# Patient Record
Sex: MALE | Race: WHITE | NOT HISPANIC OR LATINO | Employment: FULL TIME | URBAN - METROPOLITAN AREA
[De-identification: names, ages, dates, MRNs, and addresses within clinical notes are randomized per-mention and may not be internally consistent; named-entity substitution may affect disease eponyms.]

---

## 2018-02-14 ENCOUNTER — HOSPITAL ENCOUNTER (EMERGENCY)
Facility: HOSPITAL | Age: 58
Discharge: HOME | End: 2018-02-14
Attending: EMERGENCY MEDICINE | Admitting: EMERGENCY MEDICINE
Payer: COMMERCIAL

## 2018-02-14 VITALS
OXYGEN SATURATION: 96 % | SYSTOLIC BLOOD PRESSURE: 139 MMHG | RESPIRATION RATE: 20 BRPM | HEART RATE: 63 BPM | WEIGHT: 223 LBS | TEMPERATURE: 97.3 F | DIASTOLIC BLOOD PRESSURE: 97 MMHG

## 2018-02-14 DIAGNOSIS — J32.9 SINUSITIS: Primary | ICD-10-CM

## 2018-02-14 PROCEDURE — 99283 EMERGENCY DEPT VISIT LOW MDM: CPT

## 2018-02-14 RX ORDER — AMOXICILLIN AND CLAVULANATE POTASSIUM 875; 125 MG/1; MG/1
1 TABLET, FILM COATED ORAL ONCE
Status: COMPLETED | OUTPATIENT
Start: 2018-02-14 | End: 2018-02-14

## 2018-02-14 RX ORDER — LORATADINE 10 MG/1
10 TABLET ORAL DAILY
Qty: 20 TABLET | Refills: 0 | Status: SHIPPED | OUTPATIENT
Start: 2018-02-14 | End: 2018-02-16

## 2018-02-14 RX ORDER — TRAMADOL HYDROCHLORIDE 50 MG/1
100 TABLET ORAL ONCE
Status: COMPLETED | OUTPATIENT
Start: 2018-02-14 | End: 2018-02-14

## 2018-02-14 RX ORDER — AMOXICILLIN AND CLAVULANATE POTASSIUM 875; 125 MG/1; MG/1
1 TABLET, FILM COATED ORAL EVERY 12 HOURS SCHEDULED
Qty: 20 TABLET | Refills: 0 | Status: SHIPPED | OUTPATIENT
Start: 2018-02-14 | End: 2018-02-24

## 2018-02-14 RX ADMIN — TRAMADOL HYDROCHLORIDE 100 MG: 50 TABLET, FILM COATED ORAL at 21:09

## 2018-02-14 RX ADMIN — AMOXICILLIN AND CLAVULANATE POTASSIUM 1 TABLET: 875; 125 TABLET, FILM COATED ORAL at 21:09

## 2018-02-15 NOTE — ED NOTES
Pt reports pain to left side of face and under left eye extending towards left ear  Pt reports pain same a couple of weeks ago that subsided on its own  Pt reports pain to area with pressure, runny nose, post nasal drip and pt reports feeling dizzy a "couple times "  No distress noted         Kaia Barrios RN  02/14/18 2041

## 2018-02-15 NOTE — ED PROVIDER NOTES
History  Chief Complaint   Patient presents with    Facial Pain     L sided facial pain for weeks, was relieved with sinus meds but it came back     56 yowm says he feels like he has a sinus infection on left  C/o aching pain in left face off and on x 2 weeks  Not sharp, shooting or elcectric shock like  + partial relief with otc sinus medicine  Today, pain worse and more constant   + associated congestion  + felt a little dizzy today  No vision changes, no n/v/d  No fever  Prior to Admission Medications   Prescriptions Last Dose Informant Patient Reported? Taking? OMEPRAZOLE PO   Yes Yes   Sig: Take by mouth daily      Facility-Administered Medications: None       Past Medical History:   Diagnosis Date    GERD (gastroesophageal reflux disease)        Past Surgical History:   Procedure Laterality Date    KIDNEY SURGERY         History reviewed  No pertinent family history  I have reviewed and agree with the history as documented  Social History   Substance Use Topics    Smoking status: Never Smoker    Smokeless tobacco: Never Used    Alcohol use Yes      Comment: socially        Review of Systems   Constitutional: Negative  Negative for chills and fever  HENT: Positive for congestion and sinus pressure  Negative for sore throat  Eyes: Negative  Respiratory: Negative  Negative for cough and shortness of breath  Cardiovascular: Negative  Negative for chest pain and leg swelling  Gastrointestinal: Negative  Negative for abdominal pain, diarrhea, nausea and vomiting  Genitourinary: Negative  Negative for dysuria, flank pain and hematuria  Musculoskeletal: Negative  Negative for back pain and myalgias  Skin: Negative  Negative for rash and wound  Neurological: Positive for dizziness  Negative for headaches  Psychiatric/Behavioral: Negative  Negative for confusion and hallucinations  The patient is not nervous/anxious      All other systems reviewed and are negative  Physical Exam  ED Triage Vitals [02/14/18 1919]   Temperature Pulse Respirations Blood Pressure SpO2   (!) 97 3 °F (36 3 °C) 75 20 (!) 140/102 96 %      Temp Source Heart Rate Source Patient Position - Orthostatic VS BP Location FiO2 (%)   Tympanic Monitor Sitting Left arm --      Pain Score       5           Orthostatic Vital Signs  Vitals:    02/14/18 1919   BP: (!) 140/102   Pulse: 75   Patient Position - Orthostatic VS: Sitting       Physical Exam   Constitutional: He is oriented to person, place, and time  He appears well-developed and well-nourished  No distress  Not ill or toxic appearing  Not  In distress  HENT:   Head: Normocephalic and atraumatic  Right Ear: Tympanic membrane normal    Left Ear: Tympanic membrane normal    Nose: Nose normal    Mouth/Throat: No oropharyngeal exudate  Eyes: Conjunctivae are normal  Pupils are equal, round, and reactive to light  No scleral icterus  Neck: Normal range of motion  Neck supple  Cardiovascular: Normal rate, regular rhythm and normal heart sounds  No murmur heard  Pulmonary/Chest: Effort normal and breath sounds normal  No respiratory distress  Abdominal: Soft  He exhibits no distension  Musculoskeletal: Normal range of motion  He exhibits no edema, tenderness or deformity  Neurological: He is alert and oriented to person, place, and time  No cranial nerve deficit  He exhibits normal muscle tone  Skin: Skin is warm and dry  No rash noted  He is not diaphoretic  No erythema  No pallor  Psychiatric: He has a normal mood and affect  His behavior is normal    Nursing note and vitals reviewed        ED Medications  Medications   amoxicillin-clavulanate (AUGMENTIN) 875-125 mg per tablet 1 tablet (not administered)   traMADol (ULTRAM) tablet 100 mg (not administered)       Diagnostic Studies  Results Reviewed     None                 No orders to display              Procedures  Procedures       Phone Contacts  ED Phone Contact    ED Course  ED Course                                MDM  Number of Diagnoses or Management Options  Diagnosis management comments: Will try course of augmentin, claritin D  Advised tylenol/advil prn  Follow up if any problems or worsening  CritCare Time    Disposition  Final diagnoses:   Sinusitis     Time reflects when diagnosis was documented in both MDM as applicable and the Disposition within this note     Time User Action Codes Description Comment    6/91/0185  4:54 PM Irineo MATOS Add [K70 7] Sinusitis       ED Disposition     ED Disposition Condition Comment    Discharge  [facility] discharge to home/self care  Condition at discharge: Stable        Follow-up Information     Follow up With Specialties Details Why Contact Info    your doctor  Schedule an appointment as soon as possible for a visit As needed         Patient's Medications   Discharge Prescriptions    AMOXICILLIN-CLAVULANATE (AUGMENTIN) 875-125 MG PER TABLET    Take 1 tablet by mouth every 12 (twelve) hours for 10 days       Start Date: 2/14/2018 End Date: 2/24/2018       Order Dose: 1 tablet       Quantity: 20 tablet    Refills: 0    LORATADINE (CLARITIN) 10 MG TABLET    Take 1 tablet (10 mg total) by mouth daily       Start Date: 2/14/2018 End Date: --       Order Dose: 10 mg       Quantity: 20 tablet    Refills: 0     No discharge procedures on file      ED Provider  Electronically Signed by           Pretty Umanzor MD  29/39/28 3410

## 2018-02-15 NOTE — DISCHARGE INSTRUCTIONS
Rhinosinusitis - take the antibiotic as prescribed and Claritin-D or Allegra-D (these are over the counter)  Tylenol and/or advil as needed for pain  See your doctor if any problems  WHAT YOU NEED TO KNOW:   Rhinosinusitis (RS) is inflammation of your nose and sinuses  It commonly begins as a virus, often as a common cold  Viruses usually last 7 to 10 days and do not need treatment  When the virus does not get better on its own, you may have bacterial RS  This means that bacteria have begun to grow inside your sinuses  Acute RS lasts less than 4 weeks  Chronic RS lasts 12 weeks or more  Recurrent RS is when you have 4 or more episodes of RS in one year  DISCHARGE INSTRUCTIONS:   Return to the emergency department if:   · Your eye and eyelid are red, swollen, and painful  · You cannot open your eye  · You have double vision or you cannot see  · Your eyeball bulges out or you cannot move your eye  · You are more sleepy than normal or you notice changes in your ability to think, move, or talk  · You have a stiff neck, a fever, or a bad headache  · You have swelling of your forehead or scalp  Contact your healthcare provider if:   · Your symptoms are worse or do not improve after 3 to 5 days of treatment  · You have questions or concerns about your condition or care  Medicines: You may need any of the following:  · Acetaminophen  decreases pain and fever  It is available without a doctor's order  Ask how much to take and how often to take it  Follow directions  Acetaminophen can cause liver damage if not taken correctly  · NSAIDs , such as ibuprofen, help decrease swelling, pain, and fever  This medicine is available with or without a doctor's order  NSAIDs can cause stomach bleeding or kidney problems in certain people  If you take blood thinner medicine, always ask your healthcare provider if NSAIDs are safe for you   Always read the medicine label and follow directions  · Nasal saline sprays  decrease inflammation in your nose and sinuses  · Decongestants  reduce swelling and drain mucus in the nose and sinuses  They may help you breathe easier  · Antihistamines  dry mucus in the nose and relieve sneezing  · Antibiotics  treat a bacterial infection and may be needed if your symptoms do not improve or they get worse  · Take your medicine as directed  Contact your healthcare provider if you think your medicine is not helping or if you have side effects  Tell him or her if you are allergic to any medicine  Keep a list of the medicines, vitamins, and herbs you take  Include the amounts, and when and why you take them  Bring the list or the pill bottles to follow-up visits  Carry your medicine list with you in case of an emergency  Self-care:   · Rinse your sinuses  Use a sinus rinse device to rinse your nasal passages with a saline (salt water) solution  This will help thin the mucus in your nose and rinse away pollen and dirt  It will also help reduce swelling so you can breathe normally  Ask your healthcare provider how often to do this  · Breathe in steam   Heat a bowl of water until you see steam  Lean over the bowl and make a tent over your head with a large towel  Breathe deeply for about 20 minutes  Be careful not to get too close to the steam or burn yourself  Do this 3 times a day  You can also breathe deeply when you take a hot shower  · Sleep with your head elevated  Place an extra pillow under your head before you go to sleep to help your sinuses drain  · Drink liquids as directed  Ask your healthcare provider how much liquid to drink each day and which liquids are best for you  Liquids will thin the mucus in your nose and help it drain  Avoid drinks that contain alcohol or caffeine  · Do not smoke, and avoid secondhand smoke  Nicotine and other chemicals in cigarettes and cigars can make your symptoms worse   Ask your healthcare provider for information if you currently smoke and need help to quit  E-cigarettes or smokeless tobacco still contain nicotine  Talk to your healthcare provider before you use these products  Follow up with your healthcare provider as directed: Follow up if your symptoms are worse or not better after 3 to 5 days of treatment  Write down your questions so you remember to ask them during your visits  © 2017 [employer] [employer] Information is for End User's use only and may not be sold, redistributed or otherwise used for commercial purposes  All illustrations and images included in CareNotes® are the copyrighted property of [employer] , [employer]  or [employer]aleb Cornejo  The above information is an  only  It is not intended as medical advice for individual conditions or treatments  Talk to your doctor, nurse or pharmacist before following any medical regimen to see if it is safe and effective for you

## 2018-02-16 ENCOUNTER — HOSPITAL ENCOUNTER (EMERGENCY)
Facility: HOSPITAL | Age: 58
Discharge: HOME | End: 2018-02-16
Attending: EMERGENCY MEDICINE
Payer: COMMERCIAL

## 2018-02-16 VITALS
TEMPERATURE: 96.9 F | DIASTOLIC BLOOD PRESSURE: 120 MMHG | OXYGEN SATURATION: 97 % | RESPIRATION RATE: 20 BRPM | HEART RATE: 70 BPM | SYSTOLIC BLOOD PRESSURE: 180 MMHG

## 2018-02-16 DIAGNOSIS — J32.9 SINUSITIS: Primary | ICD-10-CM

## 2018-02-16 PROCEDURE — 99283 EMERGENCY DEPT VISIT LOW MDM: CPT

## 2018-02-16 RX ORDER — FLUTICASONE PROPIONATE 50 MCG
1 SPRAY, SUSPENSION (ML) NASAL DAILY
Qty: 16 G | Refills: 0 | Status: SHIPPED | OUTPATIENT
Start: 2018-02-16

## 2018-02-16 NOTE — DISCHARGE INSTRUCTIONS

## 2018-02-16 NOTE — ED PROVIDER NOTES
History  Chief Complaint   Patient presents with    Sinus Problem     dx with sinusitis a couple of days ago  was here, put on antibiotics  states pain is so severe     Patient has history of sinusitis which is recurrent and recently has been having problems with a sinus congestion and pain again  Patient has a fullness feeling in his head above his eyes and in the maxillary area, sometimes worse with position change of bending over  Is no fever  Patient was seen here and was seen by his PCP, and the symptoms have been ongoing for about 2 weeks he was placed on antibiotics which she has been taking  Patient however has not noted any improvement with the antibiotics and still having congestion and pain  As we were discussing options he states he has categorically against taking strong decongestants like Sudafed  It was only after extensive discussion that he agree to [facility] as a steroid that he also is against            Prior to Admission Medications   Prescriptions Last Dose Informant Patient Reported? Taking? OMEPRAZOLE PO   Yes No   Sig: Take by mouth daily   amoxicillin-clavulanate (AUGMENTIN) 875-125 mg per tablet   No No   Sig: Take 1 tablet by mouth every 12 (twelve) hours for 10 days      Facility-Administered Medications: None       Past Medical History:   Diagnosis Date    GERD (gastroesophageal reflux disease)        Past Surgical History:   Procedure Laterality Date    KIDNEY SURGERY         History reviewed  No pertinent family history  I have reviewed and agree with the history as documented  Social History   Substance Use Topics    Smoking status: Never Smoker    Smokeless tobacco: Never Used    Alcohol use Yes      Comment: socially        Review of Systems   Constitutional: Negative for fever  HENT: Positive for congestion, sinus pain, sinus pressure and sore throat  Negative for sneezing  Eyes: Positive for pain  Neurological: Positive for headaches     All other systems reviewed and are negative  Physical Exam  ED Triage Vitals [02/16/18 1410]   Temperature Pulse Respirations Blood Pressure SpO2   (!) 96 9 °F (36 1 °C) 70 20 (!) 180/120 97 %      Temp Source Heart Rate Source Patient Position - Orthostatic VS BP Location FiO2 (%)   Tympanic Monitor Sitting Left arm --      Pain Score       7           Orthostatic Vital Signs  Vitals:    02/16/18 1410   BP: (!) 180/120   Pulse: 70   Patient Position - Orthostatic VS: Sitting       Physical Exam   Constitutional: He is oriented to person, place, and time  He appears well-developed and well-nourished  HENT:   Head: Normocephalic and atraumatic  Right Ear: External ear normal    Left Ear: External ear normal    Mouth/Throat: Oropharynx is clear and moist  No oropharyngeal exudate  Tender to palpation on frontal and ethmoid and maxillary sinus   Eyes: Conjunctivae and EOM are normal    Neck: Normal range of motion  Neck supple  Cardiovascular: Normal rate, regular rhythm and normal heart sounds  Pulmonary/Chest: Effort normal and breath sounds normal    Abdominal: Soft  Bowel sounds are normal    Musculoskeletal: Normal range of motion  Neurological: He is alert and oriented to person, place, and time  Skin: Skin is warm and dry  Psychiatric: He has a normal mood and affect  His behavior is normal    Nursing note and vitals reviewed  ED Medications  Medications - No data to display    Diagnostic Studies  Results Reviewed     None                 No orders to display              Procedures  Procedures       Phone Contacts  ED Phone Contact    ED Course  ED Course                                MDM  Number of Diagnoses or Management Options  Sinusitis:   Diagnosis management comments: Patient has chronic and acute recurrent sinusitis  I tried to encourage him to maximize therapy as much as possible    Also he should consider ENT consultation for possible surgery    CritCare Time    Disposition  Final diagnoses: Sinusitis     Time reflects when diagnosis was documented in both MDM as applicable and the Disposition within this note     Time User Action Codes Description Comment    2/16/2018  3:02 PM Luis Madridence CARLO Juarez [J32 9] Sinusitis       ED Disposition     ED Disposition Condition Comment    Discharge  [facility] discharge to home/self care  Condition at discharge: Stable        Follow-up Information     Follow up With Specialties Details Why Susan 140, CRNP Nurse Practitioner Schedule an appointment as soon as possible for a visit in 1 day  [address] [place] [address]  [phone]          Discharge Medication List as of 2/16/2018  3:05 PM      START taking these medications    Details   fluticasone (FLONASE) 50 mcg/act nasal spray 1 spray into each nostril daily, Starting Fri 2/16/2018, Print         CONTINUE these medications which have NOT CHANGED    Details   amoxicillin-clavulanate (AUGMENTIN) 875-125 mg per tablet Take 1 tablet by mouth every 12 (twelve) hours for 10 days, Starting Wed 2/14/2018, Until Sat 2/24/2018, Print      OMEPRAZOLE PO Take by mouth daily, Historical Med           No discharge procedures on file      ED Provider  Electronically Signed by           Delio Chiu MD  02/25/18 5860

## 2018-02-28 ENCOUNTER — HOSPITAL ENCOUNTER (EMERGENCY)
Facility: HOSPITAL | Age: 58
Discharge: HOME | End: 2018-02-28
Admitting: EMERGENCY MEDICINE
Payer: COMMERCIAL

## 2018-02-28 ENCOUNTER — APPOINTMENT (EMERGENCY)
Dept: RADIOLOGY | Facility: HOSPITAL | Age: 58
End: 2018-02-28
Payer: COMMERCIAL

## 2018-02-28 VITALS
WEIGHT: 223 LBS | OXYGEN SATURATION: 94 % | SYSTOLIC BLOOD PRESSURE: 178 MMHG | RESPIRATION RATE: 20 BRPM | TEMPERATURE: 97.6 F | HEART RATE: 73 BPM | DIASTOLIC BLOOD PRESSURE: 106 MMHG

## 2018-02-28 DIAGNOSIS — H61.20 CERUMEN IMPACTION: Primary | ICD-10-CM

## 2018-02-28 DIAGNOSIS — H92.09 EARACHE: ICD-10-CM

## 2018-02-28 PROCEDURE — 70450 CT HEAD/BRAIN W/O DYE: CPT

## 2018-02-28 PROCEDURE — 99283 EMERGENCY DEPT VISIT LOW MDM: CPT

## 2018-02-28 PROCEDURE — 96372 THER/PROPH/DIAG INJ SC/IM: CPT

## 2018-02-28 RX ORDER — NAPROXEN 500 MG/1
500 TABLET ORAL 2 TIMES DAILY WITH MEALS
Qty: 14 TABLET | Refills: 0 | Status: SHIPPED | OUTPATIENT
Start: 2018-02-28 | End: 2018-03-07

## 2018-02-28 RX ORDER — AMOXICILLIN AND CLAVULANATE POTASSIUM 875; 125 MG/1; MG/1
1 TABLET, FILM COATED ORAL EVERY 12 HOURS SCHEDULED
COMMUNITY

## 2018-02-28 RX ORDER — KETOROLAC TROMETHAMINE 30 MG/ML
30 INJECTION, SOLUTION INTRAMUSCULAR; INTRAVENOUS ONCE
Status: COMPLETED | OUTPATIENT
Start: 2018-02-28 | End: 2018-02-28

## 2018-02-28 RX ADMIN — CARBAMIDE PEROXIDE 6.5% 10 DROP: 6.5 LIQUID AURICULAR (OTIC) at 16:12

## 2018-02-28 RX ADMIN — KETOROLAC TROMETHAMINE 30 MG: 30 INJECTION, SOLUTION INTRAMUSCULAR at 16:12

## 2018-02-28 NOTE — DISCHARGE INSTRUCTIONS
Cerumen Impaction   WHAT YOU NEED TO KNOW:   Cerumen impaction is the blockage of the outer ear canal by tightly packed cerumen (earwax)  It is generally treated with procedures such as flushing or suctioning the ear canal or the use of instruments to remove the impaction  DISCHARGE INSTRUCTIONS:   Medicines:  · Ear drops: These are used to soften the wax in your ear  Wax softening ear drops may be bought without a prescription  Ask your healthcare provider how often you should use this medicine  Read the instructions carefully before you use the ear drops  Do the following when you put in ear drops:     ¨ Warm the drops by holding the bottle in your hands for a few minutes  Cold ear drops may make you dizzy  ¨ Lie down with the affected ear toward the ceiling  You may also stand with your head tilted to one side  ¨ Pull your ear lobe up and back, and place the correct number of drops into the ear  ¨ Keep your ear facing up for 5 to 10 minutes so the drops coat the outer ear canal      ¨ Gently clean the outer part of the ear with a cotton swab  Do not  place the cotton swab or anything inside your ear canal  This increases the risk of damaging your eardrum  · Take your medicine as directed  Contact your healthcare provider if you think your medicine is not helping or if you have side effects  Tell him of her if you are allergic to any medicine  Keep a list of the medicines, vitamins, and herbs you take  Include the amounts, and when and why you take them  Bring the list or the pill bottles to follow-up visits  Carry your medicine list with you in case of an emergency  Follow up with your healthcare provider as directed:  Write down your questions so you remember to ask them during your visits  Contact your healthcare provider if:   · You have a fever  · You have trouble hearing or ringing in your ear  · You have questions about your condition or care    Return to the emergency department if:   · You feel dizzy  · You have discharge or blood coming out of your ear  · Your ear pain does not go away or gets worse  © 2017 [employer] Information is for End User's use only and may not be sold, redistributed or otherwise used for commercial purposes  All illustrations and images included in CareNotes® are the copyrighted property of [employer]  or Caleb Cornejo  The above information is an  only  It is not intended as medical advice for individual conditions or treatments  Talk to your doctor, nurse or pharmacist before following any medical regimen to see if it is safe and effective for you

## 2018-02-28 NOTE — ED PROVIDER NOTES
History  Chief Complaint   Patient presents with   Carlos Grubbs     here a couple of weeks ago with sinus issues  today c/o ear pain and swollen glands for about 5 days  History provided by:  Patient   used: No    Earache   Location:  Bilateral  Behind ear:  No abnormality  Quality:  Aching and pressure  Severity:  Moderate  Onset quality:  Sudden  Duration:  5 days  Timing:  Constant  Progression:  Worsening  Chronicity:  New  Context: not direct blow, not elevation change, not foreign body in ear, not loud noise, not recent URI and not water in ear    Associated symptoms: no abdominal pain, no congestion, no cough, no diarrhea, no ear discharge, no fever, no headaches, no hearing loss, no neck pain, no rash, no rhinorrhea, no sore throat, no tinnitus and no vomiting    Risk factors: no recent travel, no chronic ear infection and no prior ear surgery        Prior to Admission Medications   Prescriptions Last Dose Informant Patient Reported? Taking? OMEPRAZOLE PO   Yes No   Sig: Take by mouth daily   amoxicillin-clavulanate (AUGMENTIN) 875-125 mg per tablet   Yes Yes   Sig: Take 1 tablet by mouth every 12 (twelve) hours   fluticasone (FLONASE) 50 mcg/act nasal spray   No No   Si spray into each nostril daily      Facility-Administered Medications: None       Past Medical History:   Diagnosis Date    GERD (gastroesophageal reflux disease)        Past Surgical History:   Procedure Laterality Date    KIDNEY SURGERY         History reviewed  No pertinent family history  I have reviewed and agree with the history as documented  Social History   Substance Use Topics    Smoking status: Never Smoker    Smokeless tobacco: Never Used    Alcohol use Yes      Comment: socially        Review of Systems   Constitutional: Negative for appetite change, chills, diaphoresis, fatigue and fever  HENT: Positive for ear pain and sinus pressure   Negative for congestion, ear discharge, hearing loss, rhinorrhea, sore throat and tinnitus  Eyes: Negative for photophobia and visual disturbance  Respiratory: Negative for cough, chest tightness, shortness of breath and wheezing  Gastrointestinal: Negative for abdominal pain, diarrhea, nausea and vomiting  Genitourinary: Negative  Musculoskeletal: Negative for myalgias, neck pain and neck stiffness  Skin: Negative for color change, pallor and rash  Neurological: Negative for dizziness, seizures, weakness, light-headedness and headaches  Hematological: Negative for adenopathy  Psychiatric/Behavioral: Negative for confusion  Physical Exam  ED Triage Vitals [02/28/18 1430]   Temperature Pulse Respirations Blood Pressure SpO2   97 6 °F (36 4 °C) 73 20 (!) 178/106 94 %      Temp Source Heart Rate Source Patient Position - Orthostatic VS BP Location FiO2 (%)   Tympanic Monitor Sitting Right arm --      Pain Score       7           Orthostatic Vital Signs  Vitals:    02/28/18 1430   BP: (!) 178/106   Pulse: 73   Patient Position - Orthostatic VS: Sitting       Physical Exam   Constitutional: He is oriented to person, place, and time  He appears well-developed and well-nourished  No distress  HENT:   Head: Normocephalic and atraumatic  Right Ear: External ear normal  Decreased hearing is noted  Left Ear: External ear normal  Decreased hearing is noted  Nose: Right sinus exhibits frontal sinus tenderness  Left sinus exhibits frontal sinus tenderness  Mouth/Throat: Oropharynx is clear and moist and mucous membranes are normal    Excessive cerumen bilateral auditory canals  TMs could not be fully visualized  Eyes: Conjunctivae are normal  Right eye exhibits no discharge  Left eye exhibits no discharge  Neck: Normal range of motion  Neck supple  No tracheal deviation present  Cardiovascular: Normal rate, regular rhythm, normal heart sounds and intact distal pulses  Exam reveals no friction rub      No murmur heard   Pulmonary/Chest: Effort normal and breath sounds normal  No respiratory distress  He has no wheezes  Lymphadenopathy:     He has no cervical adenopathy  Neurological: He is alert and oriented to person, place, and time  He exhibits normal muscle tone  Coordination normal    Skin: Skin is warm and dry  Capillary refill takes less than 2 seconds  No rash noted  He is not diaphoretic  No pallor  Nursing note and vitals reviewed  ED Medications  Medications   carbamide peroxide (DEBROX) 6 5 % otic solution 10 drop (10 drops Both Ears Given 2/28/18 1612)   ketorolac (TORADOL) injection 30 mg (30 mg Intramuscular Given 2/28/18 1612)       Diagnostic Studies  Results Reviewed     None                 CT head without contrast   Final Result by Rashid Rich MD (02/28 1625)      No acute intracranial abnormality  Workstation performed: [id number]                    Procedures  Procedures       Phone Contacts  ED Phone Contact    ED Course  ED Course                                MDM  Number of Diagnoses or Management Options  Cerumen impaction: new and does not require workup  Earache: new and requires workup  Diagnosis management comments: CT of the patient's head was negative for acute findings  Debrox was administered here in the ED as well as Toradol, the patient had moderate relief of symptoms  He was discharged in no acute distress with a course of Debrox, and supportive therapy for pain  He was instructed follow up with his PCP for re-evaluation as soon as possible, or return to the ED if any worsening symptoms develop  The patient verbalized understanding of all instructions         Amount and/or Complexity of Data Reviewed  Tests in the radiology section of CPT®: ordered and reviewed  Review and summarize past medical records: yes      CritCare Time    Disposition  Final diagnoses:   Cerumen impaction   Earache     Time reflects when diagnosis was documented in both MDM as applicable and the Disposition within this note     Time User Action Codes Description Comment    2/28/2018  5:13 PM Queta Chimera Add [H61 20] Cerumen impaction     2/28/2018  5:13 PM Queta Chimera Add [H92 09] Earache       ED Disposition     ED Disposition Condition Comment    Discharge  [facility] discharge to home/self care  Condition at discharge: Stable        Follow-up Information     Follow up With Specialties Details Why [address] Emergency Department Emergency Medicine Go to If symptoms worsen [address] [facility]  [phone] [facility] ED, [address], [facility], [address] [place] [address] Nurse Practitioner Go to As needed for follow-up if no improvement of symptoms [address]   [phone]           Discharge Medication List as of 2/28/2018  5:14 PM      START taking these medications    Details   carbamide peroxide (DEBROX) 6 5 % otic solution Administer 5 drops into ears 2 (two) times a day, Starting Wed 2/28/2018, Print      naproxen (NAPROSYN) 500 mg tablet Take 1 tablet (500 mg total) by mouth 2 (two) times a day with meals for 7 days, Starting Wed 2/28/2018, Until Wed 3/7/2018, Print         CONTINUE these medications which have NOT CHANGED    Details   amoxicillin-clavulanate (AUGMENTIN) 875-125 mg per tablet Take 1 tablet by mouth every 12 (twelve) hours, Historical Med      fluticasone (FLONASE) 50 mcg/act nasal spray 1 spray into each nostril daily, Starting Fri 2/16/2018, Print      OMEPRAZOLE PO Take by mouth daily, Historical Med           No discharge procedures on file      ED Provider  Electronically Signed by           Bessy Grimm PA-C  02/28/18 1954

## 2021-04-08 DIAGNOSIS — Z23 ENCOUNTER FOR IMMUNIZATION: ICD-10-CM

## 2021-04-17 ENCOUNTER — IMMUNIZATIONS (OUTPATIENT)
Dept: FAMILY MEDICINE CLINIC | Facility: HOSPITAL | Age: 61
End: 2021-04-17

## 2021-04-17 DIAGNOSIS — Z23 ENCOUNTER FOR IMMUNIZATION: Primary | ICD-10-CM

## 2021-04-17 PROCEDURE — 91300 SARS-COV-2 / COVID-19 MRNA VACCINE (PFIZER-BIONTECH) 30 MCG: CPT

## 2021-04-17 PROCEDURE — 0001A SARS-COV-2 / COVID-19 MRNA VACCINE (PFIZER-BIONTECH) 30 MCG: CPT

## 2021-05-08 ENCOUNTER — IMMUNIZATIONS (OUTPATIENT)
Dept: FAMILY MEDICINE CLINIC | Facility: HOSPITAL | Age: 61
End: 2021-05-08

## 2021-05-08 DIAGNOSIS — Z23 ENCOUNTER FOR IMMUNIZATION: Primary | ICD-10-CM

## 2021-05-08 PROCEDURE — 0002A SARS-COV-2 / COVID-19 MRNA VACCINE (PFIZER-BIONTECH) 30 MCG: CPT

## 2021-05-08 PROCEDURE — 91300 SARS-COV-2 / COVID-19 MRNA VACCINE (PFIZER-BIONTECH) 30 MCG: CPT

## 2023-05-01 ENCOUNTER — OFFICE VISIT (OUTPATIENT)
Dept: FAMILY MEDICINE CLINIC | Facility: CLINIC | Age: 63
End: 2023-05-01

## 2023-05-01 ENCOUNTER — TELEPHONE (OUTPATIENT)
Dept: ADMINISTRATIVE | Facility: OTHER | Age: 63
End: 2023-05-01

## 2023-05-01 VITALS
HEIGHT: 70 IN | BODY MASS INDEX: 31.64 KG/M2 | RESPIRATION RATE: 16 BRPM | TEMPERATURE: 97.6 F | OXYGEN SATURATION: 97 % | WEIGHT: 221 LBS | DIASTOLIC BLOOD PRESSURE: 82 MMHG | SYSTOLIC BLOOD PRESSURE: 120 MMHG | HEART RATE: 66 BPM

## 2023-05-01 DIAGNOSIS — E78.5 DYSLIPIDEMIA: ICD-10-CM

## 2023-05-01 DIAGNOSIS — I10 PRIMARY HYPERTENSION: ICD-10-CM

## 2023-05-01 DIAGNOSIS — Z90.5 HISTORY OF RIGHT NEPHRECTOMY: ICD-10-CM

## 2023-05-01 DIAGNOSIS — Z76.89 ENCOUNTER TO ESTABLISH CARE: Primary | ICD-10-CM

## 2023-05-01 DIAGNOSIS — R05.3 CHRONIC COUGH: ICD-10-CM

## 2023-05-01 RX ORDER — LISINOPRIL 10 MG/1
20 TABLET ORAL DAILY
COMMUNITY
End: 2023-05-01

## 2023-05-01 RX ORDER — AMLODIPINE BESYLATE 10 MG/1
10 TABLET ORAL DAILY
COMMUNITY
Start: 2023-04-16 | End: 2023-05-01 | Stop reason: SDUPTHER

## 2023-05-01 RX ORDER — ATORVASTATIN CALCIUM 20 MG/1
20 TABLET, FILM COATED ORAL DAILY
Qty: 90 TABLET | Refills: 2 | Status: SHIPPED | OUTPATIENT
Start: 2023-05-01

## 2023-05-01 RX ORDER — LOSARTAN POTASSIUM 50 MG/1
50 TABLET ORAL DAILY
Qty: 90 TABLET | Refills: 2 | Status: SHIPPED | OUTPATIENT
Start: 2023-05-01

## 2023-05-01 RX ORDER — AMLODIPINE BESYLATE 10 MG/1
10 TABLET ORAL DAILY
Qty: 90 TABLET | Refills: 2 | Status: SHIPPED | OUTPATIENT
Start: 2023-05-01

## 2023-05-01 RX ORDER — CHOLECALCIFEROL (VITAMIN D3) 1250 MCG
CAPSULE ORAL DAILY
COMMUNITY

## 2023-05-01 RX ORDER — LISINOPRIL AND HYDROCHLOROTHIAZIDE 20; 12.5 MG/1; MG/1
1 TABLET ORAL DAILY
COMMUNITY
Start: 2023-04-16 | End: 2023-05-01

## 2023-05-01 RX ORDER — ATORVASTATIN CALCIUM 20 MG/1
20 TABLET, FILM COATED ORAL DAILY
COMMUNITY
Start: 2023-04-16 | End: 2023-05-01 | Stop reason: SDUPTHER

## 2023-05-01 NOTE — TELEPHONE ENCOUNTER
Upon review of the In Basket request we were able to locate, review, and update the patient chart as requested for CRC: FIT/FOBT (3)  Any additional questions or concerns should be emailed to the Practice Liaisons via the appropriate education email address, please do not reply via In Basket      Thank you  Talib Donnelly

## 2023-05-01 NOTE — TELEPHONE ENCOUNTER
----- Message from [facility] sent at 5/1/2023  9:32 AM EDT -----  05/01/23 9:33 AM    Hello, our patient Kayli Faulkner has had CRC: Cologuard completed/performed  Please assist in updating the patient chart by making an External outreach to Dr Farzaneh Christensen facility located in [facility]  The date of service is [id number]      Thank you,  MINA Valle  [facility] [phone] [place] [address]

## 2023-05-01 NOTE — PROGRESS NOTES
Assessment/Plan:    1  Encounter to establish care    2  Primary hypertension  -     losartan (COZAAR) 50 mg tablet; Take 1 tablet (50 mg total) by mouth daily  -     amLODIPine (NORVASC) 10 mg tablet; Take 1 tablet (10 mg total) by mouth daily    3  Dyslipidemia  -     atorvastatin (LIPITOR) 20 mg tablet; Take 1 tablet (20 mg total) by mouth daily    4  History of right nephrectomy  Comments:  kidney donated 2012 5  Chronic cough  Comments:  possible ACE (lisinopril) cough vs PND        The case discussed with patient using patient centered shared decision making  The patient was counseled regarding instructions for management,-- risk factor reductions,-- prognosis,-- impressions,-- risks and benefits of treatment options,-- importance of compliance with treatment  I have reviewed the instructions with the patient, answering all questions to his satisfaction  1  Cough  Exam normal  Some post nasal drip  Switch to  ARB  Consider flonase if no better  Recheck 3 months    2  Labs, colon cancer screen UTD  Will reach out to former pcp for records    3  HTN  controlled  BMI Counseling: Body mass index is 31 71 kg/m²  The BMI is above normal  Nutrition recommendations include decreasing portion sizes, moderation in carbohydrate intake and increasing intake of lean protein  Rationale for BMI follow-up plan is due to patient being overweight or obese  Depression Screening and Follow-up Plan: Patient was screened for depression during today's encounter  They screened negative with a PHQ-2 score of 0  Return in about 3 months (around 8/1/2023)      I have spent a total time of 35 minutes on 05/01/23 in caring for this patient including Prognosis, Risks and benefits of tx options, Instructions for management, Patient and family education, Importance of tx compliance, Risk factor reductions, Impressions, Counseling / Coordination of care, Documenting in the medical record, Reviewing / ordering tests, medicine, procedures   and Obtaining or reviewing history    Subjective:      Patient ID: Ellen Ashby is a 66 y o  male  Chief Complaint   Patient presents with    [facility] care--New patient    Cough     Chronic cough with clear phlegm       65 yo male with h/o htn, dyslipidemia presents to establish care    He is doing well overall    cologuard and labs utd -6 months ago    He c/o nuisance dry cough for months  Allergy testing nl per pt  He is on lisinopril for several years  Denies chest pain, shortness of breath, weakness, dizziness    Reviewed medical history in detail  Changes to medical record changed where appropriate  Reviewed medications  Patient taking as prescribed  Tolerating well  Denies Side effects  Reviewed recent visits with specialists co-managing chronic conditions  The following portions of the patient's history were reviewed and updated as appropriate: allergies, current medications, past family history, past medical history, past social history, past surgical history and problem list     Review of Systems   Constitutional: Negative for fatigue, fever and unexpected weight change  HENT: Negative for trouble swallowing  Respiratory: Positive for cough  Negative for shortness of breath  Cardiovascular: Negative for chest pain, palpitations and leg swelling  Gastrointestinal: Negative for abdominal pain and blood in stool  Endocrine: Negative  Genitourinary: Negative for decreased urine volume, difficulty urinating and hematuria  Musculoskeletal: Negative for gait problem  Skin: Negative for pallor  Neurological: Negative for dizziness, syncope and weakness  Hematological: Does not bruise/bleed easily  Psychiatric/Behavioral: Negative for dysphoric mood  The patient is not nervous/anxious            Current Outpatient Medications   Medication Sig Dispense Refill    amLODIPine (NORVASC) 10 mg tablet Take 1 tablet (10 mg total) by mouth "daily 90 tablet 2    atorvastatin (LIPITOR) 20 mg tablet Take 1 tablet (20 mg total) by mouth daily 90 tablet 2    Cholecalciferol (Vitamin D3) 1 25 MG (54429 UT) CAPS Take by mouth in the morning      Cyanocobalamin (B-12 PO) Take by mouth in the morning      losartan (COZAAR) 50 mg tablet Take 1 tablet (50 mg total) by mouth daily 90 tablet 2    Omega-3 Fatty Acids (FISH OIL PO) Take 1,400 mg by mouth      OMEPRAZOLE PO Take by mouth daily      amoxicillin-clavulanate (AUGMENTIN) 875-125 mg per tablet Take 1 tablet by mouth every 12 (twelve) hours (Patient not taking: Reported on 5/1/2023)      carbamide peroxide (DEBROX) 6 5 % otic solution Administer 5 drops into ears 2 (two) times a day (Patient not taking: Reported on 5/1/2023) 15 mL 0    fluticasone (FLONASE) 50 mcg/act nasal spray 1 spray into each nostril daily (Patient not taking: Reported on 5/1/2023) 16 g 0    naproxen (NAPROSYN) 500 mg tablet Take 1 tablet (500 mg total) by mouth 2 (two) times a day with meals for 7 days 14 tablet 0     No current facility-administered medications for this visit  Patient Active Problem List   Diagnosis    History of right nephrectomy    Primary hypertension    Dyslipidemia       Objective:    /82   Pulse 66   Temp 97 6 °F (36 4 °C) (Temporal)   Resp 16   Ht 5' 10\" (1 778 m)   Wt 100 kg (221 lb)   SpO2 97%   BMI 31 71 kg/m²        Physical Exam  Vitals and nursing note reviewed  Constitutional:       General: He is not in acute distress  Appearance: Normal appearance  He is obese  HENT:      Head: Normocephalic and atraumatic  Mouth/Throat:      Pharynx: Posterior oropharyngeal erythema present  Neck:      Vascular: No carotid bruit  Cardiovascular:      Rate and Rhythm: Normal rate and regular rhythm  Pulses: Normal pulses  Heart sounds: Normal heart sounds  Pulmonary:      Effort: Pulmonary effort is normal       Breath sounds: Normal breath sounds   No " wheezing or rales  Abdominal:      General: Bowel sounds are normal       Palpations: Abdomen is soft  Tenderness: There is no abdominal tenderness  Musculoskeletal:      Right lower leg: No edema  Left lower leg: No edema  Lymphadenopathy:      Cervical: No cervical adenopathy  Skin:     General: Skin is warm and dry  Coloration: Skin is not pale  Neurological:      General: No focal deficit present  Mental Status: He is alert  Motor: No weakness        Gait: Gait normal    Psychiatric:         Mood and Affect: Mood normal                 MINA Castillo

## 2023-05-04 ENCOUNTER — TELEPHONE (OUTPATIENT)
Dept: FAMILY MEDICINE CLINIC | Facility: CLINIC | Age: 63
End: 2023-05-04

## 2023-05-04 NOTE — TELEPHONE ENCOUNTER
Called and spoke to Allie Welsh to have her fax lab here from prior PCP, received lab and scanned to chart

## 2023-05-08 ENCOUNTER — TELEPHONE (OUTPATIENT)
Dept: FAMILY MEDICINE CLINIC | Facility: CLINIC | Age: 63
End: 2023-05-08

## 2023-05-08 NOTE — TELEPHONE ENCOUNTER
Please call Nomi Kowalski and advise that I got his recent blood work from Dr Michelle Jackson office- which was done on 2/6/2023  Results were fine  Labs up to date at this time  I will order prostate cancer screening blood work at next office visit

## 2023-06-14 ENCOUNTER — OFFICE VISIT (OUTPATIENT)
Dept: FAMILY MEDICINE CLINIC | Facility: CLINIC | Age: 63
End: 2023-06-14
Payer: COMMERCIAL

## 2023-06-14 VITALS
WEIGHT: 224.6 LBS | HEIGHT: 70 IN | HEART RATE: 80 BPM | SYSTOLIC BLOOD PRESSURE: 136 MMHG | TEMPERATURE: 97.6 F | BODY MASS INDEX: 32.16 KG/M2 | RESPIRATION RATE: 16 BRPM | DIASTOLIC BLOOD PRESSURE: 78 MMHG | OXYGEN SATURATION: 98 %

## 2023-06-14 DIAGNOSIS — R60.0 BILATERAL LEG EDEMA: ICD-10-CM

## 2023-06-14 DIAGNOSIS — Z12.5 PROSTATE CANCER SCREENING: ICD-10-CM

## 2023-06-14 DIAGNOSIS — H11.32 CONJUNCTIVAL HEMORRHAGE OF LEFT EYE: ICD-10-CM

## 2023-06-14 DIAGNOSIS — I10 PRIMARY HYPERTENSION: Primary | ICD-10-CM

## 2023-06-14 DIAGNOSIS — Z13.0 SCREENING, DEFICIENCY ANEMIA, IRON: ICD-10-CM

## 2023-06-14 PROCEDURE — 99213 OFFICE O/P EST LOW 20 MIN: CPT | Performed by: NURSE PRACTITIONER

## 2023-06-14 RX ORDER — AMLODIPINE BESYLATE 5 MG/1
5 TABLET ORAL DAILY
Qty: 90 TABLET | Refills: 2 | Status: SHIPPED | OUTPATIENT
Start: 2023-06-14

## 2023-06-14 RX ORDER — HYDROCHLOROTHIAZIDE 12.5 MG/1
12.5 TABLET ORAL DAILY
Qty: 90 TABLET | Refills: 3 | Status: SHIPPED | OUTPATIENT
Start: 2023-06-14 | End: 2024-06-08

## 2023-06-14 RX ORDER — LOSARTAN POTASSIUM 100 MG/1
100 TABLET ORAL DAILY
Qty: 90 TABLET | Refills: 3 | Status: SHIPPED | OUTPATIENT
Start: 2023-06-14

## 2023-06-14 NOTE — PROGRESS NOTES
Assessment/Plan:    1  Primary hypertension  -     losartan (COZAAR) 100 MG tablet; Take 1 tablet (100 mg total) by mouth daily  -     amLODIPine (NORVASC) 5 mg tablet; Take 1 tablet (5 mg total) by mouth daily  -     hydrochlorothiazide (HYDRODIURIL) 12 5 mg tablet; Take 1 tablet (12 5 mg total) by mouth daily  -     Basic metabolic panel; Future  -     CBC and differential; Future  -     TSH, 3rd generation with Free T4 reflex; Future    2  Conjunctival hemorrhage of left eye    3  Bilateral leg edema  -     hydrochlorothiazide (HYDRODIURIL) 12 5 mg tablet; Take 1 tablet (12 5 mg total) by mouth daily  -     Basic metabolic panel; Future  -     CBC and differential; Future  -     TSH, 3rd generation with Free T4 reflex; Future    4  Screening, deficiency anemia, iron  -     CBC and differential; Future    5  Prostate cancer screening  -     PSA, Total Screen; Future        The case discussed with patient using patient centered shared decision making  The patient was counseled regarding instructions for management,-- risk factor reductions,-- prognosis,-- impressions,-- risks and benefits of treatment options,-- importance of compliance with treatment  I have reviewed the instructions with the patient, answering all questions to his satisfaction  Suspect intermittent edema in setting of hot weather, salty foods, amlodipine, varicose veins  Med adjustments per orders  Labs per orders    Elevate legs  Compression stockings  Low salt diet  Increase water intake  Recheck 1 month    There are no Patient Instructions on file for this visit  No follow-ups on file  Subjective:      Patient ID: Ivelisse Cornejo is a 66 y o  male  Chief Complaint   Patient presents with   • Edema     Swollen ankles and feet , BP check        67 yo presents for same day    He reports new ankle swelling since outdoor temps are warmer  Swelling b/l    He has h/o problems with varicose veins   He is on amlodipine 10 mg per previous pcp    He denies leg pain, redness, wounds  Swelling gone in am  He works 2nd shift  Walking all shift  Wears compression stockings occasionally    Denies chest pain, shortness of breath, orthopnea, palps, dizziness    Cough gone since d/c lisinopril      The following portions of the patient's history were reviewed and updated as appropriate: allergies, current medications, past family history, past medical history, past social history, past surgical history and problem list     Review of Systems   Constitutional: Negative  Eyes: Negative for photophobia, pain and visual disturbance  Respiratory: Negative  Cardiovascular: Positive for leg swelling  Negative for chest pain and palpitations  Neurological: Negative            Current Outpatient Medications   Medication Sig Dispense Refill   • amLODIPine (NORVASC) 5 mg tablet Take 1 tablet (5 mg total) by mouth daily 90 tablet 2   • atorvastatin (LIPITOR) 20 mg tablet Take 1 tablet (20 mg total) by mouth daily 90 tablet 2   • Cholecalciferol (Vitamin D3) 1 25 MG (24841 UT) CAPS Take by mouth in the morning     • hydrochlorothiazide (HYDRODIURIL) 12 5 mg tablet Take 1 tablet (12 5 mg total) by mouth daily 90 tablet 3   • losartan (COZAAR) 100 MG tablet Take 1 tablet (100 mg total) by mouth daily 90 tablet 3   • Omega-3 Fatty Acids (FISH OIL PO) Take 1,400 mg by mouth     • OMEPRAZOLE PO Take by mouth daily     • amoxicillin-clavulanate (AUGMENTIN) 875-125 mg per tablet Take 1 tablet by mouth every 12 (twelve) hours (Patient not taking: Reported on 5/1/2023)     • carbamide peroxide (DEBROX) 6 5 % otic solution Administer 5 drops into ears 2 (two) times a day (Patient not taking: Reported on 5/1/2023) 15 mL 0   • Cyanocobalamin (B-12 PO) Take by mouth in the morning (Patient not taking: Reported on 6/14/2023)     • fluticasone (FLONASE) 50 mcg/act nasal spray 1 spray into each nostril daily (Patient not taking: Reported on 5/1/2023) 16 g 0   • naproxen "(NAPROSYN) 500 mg tablet Take 1 tablet (500 mg total) by mouth 2 (two) times a day with meals for 7 days 14 tablet 0     No current facility-administered medications for this visit  Objective:    /78   Pulse 80   Temp 97 6 °F (36 4 °C) (Temporal)   Resp 16   Ht 5' 10\" (1 778 m)   Wt 102 kg (224 lb 9 6 oz)   SpO2 98%   BMI 32 23 kg/m²        Physical Exam  Vitals and nursing note reviewed  Constitutional:       General: He is not in acute distress  Appearance: Normal appearance  Eyes:     Neck:      Vascular: No carotid bruit or JVD  Cardiovascular:      Rate and Rhythm: Normal rate and regular rhythm  Pulses: Normal pulses  Heart sounds: Normal heart sounds  No murmur heard  Pulmonary:      Effort: Pulmonary effort is normal       Breath sounds: Normal breath sounds  Musculoskeletal:      Comments: 1+ankle edema b/l   No evidence of pain, pallor or erythema   Lymphadenopathy:      Cervical: No cervical adenopathy  Skin:     General: Skin is warm and dry  Coloration: Skin is not pale  Neurological:      General: No focal deficit present  Mental Status: He is alert  Mental status is at baseline                  MINA Wilson  "

## 2023-06-15 ENCOUNTER — TELEPHONE (OUTPATIENT)
Dept: FAMILY MEDICINE CLINIC | Facility: CLINIC | Age: 63
End: 2023-06-15

## 2023-06-15 NOTE — TELEPHONE ENCOUNTER
Called and spoke to pharmacist and states Amlodipine 10 mg was cancelled and did not received the new Amlodipine 5 mg script, called in for patient

## 2023-06-15 NOTE — TELEPHONE ENCOUNTER
Pt  Called in stated that he went to  his scripts and was informed by pharmacy that the amLODIPine (NORVASC) 5 mg tablet [[id number] had been canceled  Pt  Would like to know why and        would like a call back from a nurse as he wants to know if the dosage is changing as well

## 2023-07-17 ENCOUNTER — RA CDI HCC (OUTPATIENT)
Dept: OTHER | Facility: HOSPITAL | Age: 63
End: 2023-07-17

## 2023-07-25 ENCOUNTER — OFFICE VISIT (OUTPATIENT)
Dept: FAMILY MEDICINE CLINIC | Facility: CLINIC | Age: 63
End: 2023-07-25
Payer: COMMERCIAL

## 2023-07-25 VITALS
HEIGHT: 70 IN | TEMPERATURE: 98.7 F | HEART RATE: 78 BPM | OXYGEN SATURATION: 98 % | RESPIRATION RATE: 16 BRPM | DIASTOLIC BLOOD PRESSURE: 80 MMHG | SYSTOLIC BLOOD PRESSURE: 118 MMHG | WEIGHT: 223 LBS | BODY MASS INDEX: 31.92 KG/M2

## 2023-07-25 DIAGNOSIS — I10 PRIMARY HYPERTENSION: ICD-10-CM

## 2023-07-25 DIAGNOSIS — Z00.00 HEALTH CARE MAINTENANCE: Primary | ICD-10-CM

## 2023-07-25 PROCEDURE — 99396 PREV VISIT EST AGE 40-64: CPT | Performed by: NURSE PRACTITIONER

## 2023-07-25 NOTE — PROGRESS NOTES
FAMILY PRACTICE HEALTH MAINTENANCE OFFICE VISIT  [facility] - [facility]    NAME: Nayla Connolly  AGE: 66 y.o. SEX: male  : 1960     DATE: 2023    Assessment and Plan     1. Health care maintenance    2. Primary hypertension  Comments:  controlled        · Patient Counseling:   · Nutrition: Stressed importance of a well balanced diet, moderation of sodium/saturated fat, caloric balance and sufficient intake of fiber  · Exercise: Stressed the importance of regular exercise with a goal of 150 minutes per week  · Dental Health: Discussed daily flossing and brushing and regular dental visits   · Alcohol Use:  Recommended moderation of alcohol intake  · Injury Prevention: Discussed Safety Belts, Safety Helmets, and Smoke Detectors    · Immunizations reviewed: Up To Date  · Discussed benefits of:  Colon Cancer Screening and Screening labs. • BMI Counseling: Body mass index is 32 kg/m². Discussed with patient's BMI with him. The BMI is above normal. Nutrition recommendations include reducing portion sizes, moderation in carbohydrate intake and increasing intake of lean protein. Exercise recommendations include exercising 3-5 times per week. Return in about 3 months (around 10/25/2023).         Chief Complaint     Chief Complaint   Patient presents with   • Follow-up     Follow up-HTN       History of Present Illness     HPI    Well Adult Physical   Patient here for a comprehensive physical exam.      Diet and Physical Activity  Diet: well balanced diet  Exercise: weekly      Depression Screen  PHQ-2/9 Depression Screening            General Health  Hearing: Normal:  bilateral  Vision: no vision problems    Reproductive Health  No issues       The following portions of the patient's history were reviewed and updated as appropriate: allergies, current medications, past family history, past medical history, past social history, past surgical history and problem list.    Review of Systems     Review of Systems   Constitutional: Negative. Eyes: Negative for photophobia, pain and visual disturbance. Respiratory: Negative. Cardiovascular: Positive for leg swelling. Negative for chest pain and palpitations. Occasional leg swelling   Neurological: Negative. Past Medical History     Past Medical History:   Diagnosis Date   • GERD (gastroesophageal reflux disease)    • HTN (hypertension)    • Hyperlipidemia        Past Surgical History     Past Surgical History:   Procedure Laterality Date   • KIDNEY SURGERY         Social History     Social History     Socioeconomic History   • Marital status: /Civil Union     Spouse name: None   • Number of children: None   • Years of education: None   • Highest education level: None   Occupational History   • None   Tobacco Use   • Smoking status: Never   • Smokeless tobacco: Never   Vaping Use   • Vaping Use: Never used   Substance and Sexual Activity   • Alcohol use: Yes     Comment: socially   • Drug use: No   • Sexual activity: None   Other Topics Concern   • None   Social History Narrative   • None     Social Determinants of Health     Financial Resource Strain: Not on file   Food Insecurity: Not on file   Transportation Needs: Not on file   Physical Activity: Not on file   Stress: Not on file   Social Connections: Not on file   Intimate Partner Violence: Not on file   Housing Stability: Not on file       Family History     History reviewed. No pertinent family history.     Current Medications       Current Outpatient Medications:   •  amLODIPine (NORVASC) 5 mg tablet, Take 1 tablet (5 mg total) by mouth daily, Disp: 90 tablet, Rfl: 2  •  atorvastatin (LIPITOR) 20 mg tablet, Take 1 tablet (20 mg total) by mouth daily, Disp: 90 tablet, Rfl: 2  •  Cholecalciferol (Vitamin D3) 1.25 MG (81364 UT) CAPS, Take by mouth in the morning, Disp: , Rfl:   •  hydrochlorothiazide (HYDRODIURIL) 12.5 mg tablet, Take 1 tablet (12.5 mg total) by mouth daily, Disp: 90 tablet, Rfl: 3  •  losartan (COZAAR) 100 MG tablet, Take 1 tablet (100 mg total) by mouth daily, Disp: 90 tablet, Rfl: 3  •  Omega-3 Fatty Acids (FISH OIL PO), Take 1,400 mg by mouth, Disp: , Rfl:   •  OMEPRAZOLE PO, Take by mouth daily, Disp: , Rfl:   •  amoxicillin-clavulanate (AUGMENTIN) 875-125 mg per tablet, Take 1 tablet by mouth every 12 (twelve) hours (Patient not taking: Reported on 5/1/2023), Disp: , Rfl:   •  carbamide peroxide (DEBROX) 6.5 % otic solution, Administer 5 drops into ears 2 (two) times a day (Patient not taking: Reported on 5/1/2023), Disp: 15 mL, Rfl: 0  •  Cyanocobalamin (B-12 PO), Take by mouth in the morning (Patient not taking: Reported on 6/14/2023), Disp: , Rfl:   •  fluticasone (FLONASE) 50 mcg/act nasal spray, 1 spray into each nostril daily (Patient not taking: Reported on 5/1/2023), Disp: 16 g, Rfl: 0  •  naproxen (NAPROSYN) 500 mg tablet, Take 1 tablet (500 mg total) by mouth 2 (two) times a day with meals for 7 days, Disp: 14 tablet, Rfl: 0     Allergies     No Known Allergies    Objective     /80   Pulse 78   Temp 98.7 °F (37.1 °C) (Temporal)   Resp 16   Ht 5' 10" (1.778 m)   Wt 101 kg (223 lb)   SpO2 98%   BMI 32.00 kg/m²      Physical Exam  Vitals and nursing note reviewed. Constitutional:       General: He is not in acute distress. Appearance: Normal appearance. He is obese. HENT:      Head: Normocephalic and atraumatic. Neck:      Vascular: No carotid bruit. Cardiovascular:      Rate and Rhythm: Normal rate and regular rhythm. Pulses: Normal pulses. Heart sounds: Normal heart sounds. Pulmonary:      Effort: Pulmonary effort is normal.      Breath sounds: Normal breath sounds. No wheezing or rales. Abdominal:      General: Bowel sounds are normal.      Palpations: Abdomen is soft. Tenderness: There is no abdominal tenderness. Musculoskeletal:      Right lower leg: No edema.       Left lower leg: No edema. Lymphadenopathy:      Cervical: No cervical adenopathy. Skin:     General: Skin is warm and dry. Coloration: Skin is not pale. Neurological:      General: No focal deficit present. Mental Status: He is alert. Mental status is at baseline. Motor: No weakness. Gait: Gait normal.   Psychiatric:         Mood and Affect: Mood normal.         Behavior: Behavior normal.           No results found.         MINA Garcia  [facility]

## 2023-09-25 LAB
BASOPHILS # BLD AUTO: 9 CELLS/UL (ref 0–200)
BASOPHILS NFR BLD AUTO: 0.2 %
BUN SERPL-MCNC: 20 MG/DL (ref 7–25)
BUN/CREAT SERPL: ABNORMAL (CALC) (ref 6–22)
CALCIUM SERPL-MCNC: 9.1 MG/DL (ref 8.6–10.3)
CHLORIDE SERPL-SCNC: 104 MMOL/L (ref 98–110)
CO2 SERPL-SCNC: 28 MMOL/L (ref 20–32)
CREAT SERPL-MCNC: 1.34 MG/DL (ref 0.7–1.35)
EOSINOPHIL # BLD AUTO: 150 CELLS/UL (ref 15–500)
EOSINOPHIL NFR BLD AUTO: 3.4 %
ERYTHROCYTE [DISTWIDTH] IN BLOOD BY AUTOMATED COUNT: 12.7 % (ref 11–15)
GFR/BSA.PRED SERPLBLD CYS-BASED-ARV: 60 ML/MIN/1.73M2
GLUCOSE SERPL-MCNC: 101 MG/DL (ref 65–99)
HCT VFR BLD AUTO: 43.3 % (ref 38.5–50)
HGB BLD-MCNC: 15 G/DL (ref 13.2–17.1)
LYMPHOCYTES # BLD AUTO: 1087 CELLS/UL (ref 850–3900)
LYMPHOCYTES NFR BLD AUTO: 24.7 %
MCH RBC QN AUTO: 31 PG (ref 27–33)
MCHC RBC AUTO-ENTMCNC: 34.6 G/DL (ref 32–36)
MCV RBC AUTO: 89.5 FL (ref 80–100)
MONOCYTES # BLD AUTO: 370 CELLS/UL (ref 200–950)
MONOCYTES NFR BLD AUTO: 8.4 %
NEUTROPHILS # BLD AUTO: 2785 CELLS/UL (ref 1500–7800)
NEUTROPHILS NFR BLD AUTO: 63.3 %
PLATELET # BLD AUTO: 178 THOUSAND/UL (ref 140–400)
PMV BLD REES-ECKER: 9.8 FL (ref 7.5–12.5)
POTASSIUM SERPL-SCNC: 4.3 MMOL/L (ref 3.5–5.3)
PSA SERPL-MCNC: 4.95 NG/ML
RBC # BLD AUTO: 4.84 MILLION/UL (ref 4.2–5.8)
SODIUM SERPL-SCNC: 140 MMOL/L (ref 135–146)
TSH SERPL-ACNC: 1.22 MIU/L (ref 0.4–4.5)
WBC # BLD AUTO: 4.4 THOUSAND/UL (ref 3.8–10.8)

## 2023-10-25 ENCOUNTER — OFFICE VISIT (OUTPATIENT)
Dept: FAMILY MEDICINE CLINIC | Facility: CLINIC | Age: 63
End: 2023-10-25
Payer: COMMERCIAL

## 2023-10-25 VITALS
RESPIRATION RATE: 16 BRPM | TEMPERATURE: 97.6 F | HEIGHT: 70 IN | SYSTOLIC BLOOD PRESSURE: 124 MMHG | BODY MASS INDEX: 32.3 KG/M2 | DIASTOLIC BLOOD PRESSURE: 70 MMHG | HEART RATE: 63 BPM | WEIGHT: 225.6 LBS | OXYGEN SATURATION: 95 %

## 2023-10-25 DIAGNOSIS — I10 PRIMARY HYPERTENSION: Primary | ICD-10-CM

## 2023-10-25 DIAGNOSIS — Z90.5 HISTORY OF RIGHT NEPHRECTOMY: ICD-10-CM

## 2023-10-25 DIAGNOSIS — R97.20 ELEVATED PSA: ICD-10-CM

## 2023-10-25 DIAGNOSIS — Z12.5 PROSTATE CANCER SCREENING: ICD-10-CM

## 2023-10-25 PROCEDURE — 99213 OFFICE O/P EST LOW 20 MIN: CPT | Performed by: NURSE PRACTITIONER

## 2023-10-25 NOTE — PROGRESS NOTES
Assessment/Plan:    1. Primary hypertension  -     Basic metabolic panel; Future; Expected date: 04/25/2024    2. Elevated PSA  -     PSA Total, Diagnostic; Future; Expected date: 04/25/2024    3. Prostate cancer screening  -     PSA Total, Diagnostic; Future; Expected date: 04/25/2024    4. History of right nephrectomy  -     Basic metabolic panel; Future; Expected date: 04/25/2024        The case discussed with patient using patient centered shared decision making. The patient was counseled regarding instructions for management,-- risk factor reductions,-- prognosis,-- impressions,-- risks and benefits of treatment options,-- importance of compliance with treatment. I have reviewed the instructions with the patient, answering all questions to his satisfaction. Patient clinically stable at this time. Labs acceptable however he has a borderline elevated at 4.9. No previous values available. Will recheck in 6 months. If increasing will refer to urology. Patient agrees with this plan  Cont with current plan of care. RTO as recommended and PRN    There are no Patient Instructions on file for this visit. Return in about 6 months (around 4/25/2024). Subjective:      Patient ID: True Espinal is a 66 y.o. male. Chief Complaint   Patient presents with    Follow-up     3 month       59-year-old known to me presents for blood pressure check  Blood pressure has been controlled on current Rx  Patient doing well  Following heart healthy diet  Remaining active  Recently had blood work which was overall acceptable however PSA was borderline at 4.9.   Apparently this is the first when he has ever had therefore nothing to compare              The following portions of the patient's history were reviewed and updated as appropriate: allergies, current medications, past family history, past medical history, past social history, past surgical history and problem list.    Review of Systems   Constitutional: Negative. Respiratory: Negative. Cardiovascular:  Negative for chest pain, palpitations and leg swelling. Neurological: Negative. Current Outpatient Medications   Medication Sig Dispense Refill    amLODIPine (NORVASC) 5 mg tablet Take 1 tablet (5 mg total) by mouth daily 90 tablet 2    atorvastatin (LIPITOR) 20 mg tablet Take 1 tablet (20 mg total) by mouth daily 90 tablet 2    Cholecalciferol (Vitamin D3) 1.25 MG (56054 UT) CAPS Take by mouth in the morning      hydrochlorothiazide (HYDRODIURIL) 12.5 mg tablet Take 1 tablet (12.5 mg total) by mouth daily 90 tablet 3    losartan (COZAAR) 100 MG tablet Take 1 tablet (100 mg total) by mouth daily 90 tablet 3    Omega-3 Fatty Acids (FISH OIL PO) Take 1,400 mg by mouth      OMEPRAZOLE PO Take by mouth daily      amoxicillin-clavulanate (AUGMENTIN) 875-125 mg per tablet Take 1 tablet by mouth every 12 (twelve) hours (Patient not taking: Reported on 5/1/2023)      carbamide peroxide (DEBROX) 6.5 % otic solution Administer 5 drops into ears 2 (two) times a day (Patient not taking: Reported on 5/1/2023) 15 mL 0    Cyanocobalamin (B-12 PO) Take by mouth in the morning (Patient not taking: Reported on 6/14/2023)      fluticasone (FLONASE) 50 mcg/act nasal spray 1 spray into each nostril daily (Patient not taking: Reported on 5/1/2023) 16 g 0    naproxen (NAPROSYN) 500 mg tablet Take 1 tablet (500 mg total) by mouth 2 (two) times a day with meals for 7 days (Patient not taking: Reported on 10/25/2023) 14 tablet 0     No current facility-administered medications for this visit. Objective:    /70 (BP Location: Left arm, Patient Position: Sitting, Cuff Size: Standard)   Pulse 63   Temp 97.6 °F (36.4 °C) (Temporal)   Resp 16   Ht 5' 10" (1.778 m)   Wt 102 kg (225 lb 9.6 oz)   SpO2 95%   BMI 32.37 kg/m²        Physical Exam  Vitals and nursing note reviewed. Constitutional:       General: He is not in acute distress.      Appearance: Normal appearance. Eyes:     Neck:      Vascular: No carotid bruit or JVD. Cardiovascular:      Rate and Rhythm: Normal rate and regular rhythm. Pulses: Normal pulses. Heart sounds: Normal heart sounds. No murmur heard. Pulmonary:      Effort: Pulmonary effort is normal.      Breath sounds: Normal breath sounds. Musculoskeletal:      Right lower leg: No edema. Left lower leg: No edema. Lymphadenopathy:      Cervical: No cervical adenopathy. Skin:     General: Skin is warm and dry. Coloration: Skin is not pale. Neurological:      General: No focal deficit present. Mental Status: He is alert. Mental status is at baseline.                 Luis Cheng

## 2023-12-07 DIAGNOSIS — E78.5 DYSLIPIDEMIA: ICD-10-CM

## 2023-12-07 DIAGNOSIS — I10 PRIMARY HYPERTENSION: ICD-10-CM

## 2023-12-07 RX ORDER — LOSARTAN POTASSIUM 100 MG/1
100 TABLET ORAL DAILY
Qty: 30 TABLET | Refills: 11 | Status: SHIPPED | OUTPATIENT
Start: 2023-12-07

## 2023-12-07 RX ORDER — AMLODIPINE BESYLATE 5 MG/1
5 TABLET ORAL DAILY
Qty: 30 TABLET | Refills: 11 | Status: SHIPPED | OUTPATIENT
Start: 2023-12-07

## 2023-12-07 RX ORDER — ATORVASTATIN CALCIUM 20 MG/1
20 TABLET, FILM COATED ORAL DAILY
Qty: 30 TABLET | Refills: 11 | Status: SHIPPED | OUTPATIENT
Start: 2023-12-07

## 2023-12-26 ENCOUNTER — OFFICE VISIT (OUTPATIENT)
Dept: FAMILY MEDICINE CLINIC | Facility: CLINIC | Age: 63
End: 2023-12-26
Payer: COMMERCIAL

## 2023-12-26 VITALS
HEART RATE: 84 BPM | WEIGHT: 224 LBS | TEMPERATURE: 97.8 F | RESPIRATION RATE: 18 BRPM | HEIGHT: 70 IN | OXYGEN SATURATION: 96 % | BODY MASS INDEX: 32.07 KG/M2 | SYSTOLIC BLOOD PRESSURE: 110 MMHG | DIASTOLIC BLOOD PRESSURE: 76 MMHG

## 2023-12-26 DIAGNOSIS — I80.01 PHLEBITIS AND THROMBOPHLEBITIS OF SUPERFICIAL VESSELS OF RIGHT LOWER EXTREMITY: Primary | ICD-10-CM

## 2023-12-26 PROCEDURE — 99213 OFFICE O/P EST LOW 20 MIN: CPT | Performed by: NURSE PRACTITIONER

## 2023-12-26 NOTE — PROGRESS NOTES
Assessment/Plan:    1. Phlebitis and thrombophlebitis of superficial vessels of right lower extremity    The case discussed with patient using patient centered shared decision making.The patient was counseled regarding instructions for management,-- risk factor reductions,-- prognosis,-- impressions,-- risks and benefits of treatment options,-- importance of compliance with treatment. I have reviewed the instructions with the patient, answering all questions to his satisfaction.    Minor right leg superficial right leg phlebitis with no signs of DVT or infection  Discussed treatment options with patient including vascular imaging, vascular referral  He opts to start with conservative treatment including thigh-high compression stockings, elevation, monitoring-he declines referral to vascular surgeon.  He declines vascular imaging ordered.  We discussed signs and symptoms of escalation, infection, clot-he will call immediately  He will call with update and we will        There are no Patient Instructions on file for this visit.    No follow-ups on file.    Subjective:      Patient ID: Kavon Frias is a 63 y.o. male.    Chief Complaint   Patient presents with    Leg Pain     Patient states history of vericose veins. Patient he believes he is having an issue again. Right leg between knee and hip, swollen hard, tender to the touch.        63-year-old male with history of hypertension, peripheral vascular disease, varicosities presents for same-day visit  History of vein stripping greater than 20 years ago presents for varicose vein tenderness on right leg  Symptoms started 1 week ago  Pain location right upper leg  Pain is tender but soft without healing hard like a cord.  It is not hot to touch or red in appearance   He denies fever or chills  He wears knee-high GRISELDA stockings to work  Pain is superficial and not deep  He denies history of DVT        The following portions of the patient's history were reviewed and  "updated as appropriate: allergies, current medications, past family history, past medical history, past social history, past surgical history and problem list.    Review of Systems   Constitutional:  Negative for fever.   Respiratory: Negative.     Cardiovascular: Negative.    Skin:         Per HPI   Neurological: Negative.          Current Outpatient Medications   Medication Sig Dispense Refill    amLODIPine (NORVASC) 5 mg tablet Take 1 tablet (5 mg total) by mouth daily 30 tablet 11    atorvastatin (LIPITOR) 20 mg tablet Take 1 tablet (20 mg total) by mouth daily 30 tablet 11    Cholecalciferol (Vitamin D3) 1.25 MG (65396 UT) CAPS Take by mouth in the morning      hydrochlorothiazide (HYDRODIURIL) 12.5 mg tablet Take 1 tablet (12.5 mg total) by mouth daily 90 tablet 3    losartan (COZAAR) 100 MG tablet Take 1 tablet (100 mg total) by mouth daily 30 tablet 11    Omega-3 Fatty Acids (FISH OIL PO) Take 1,400 mg by mouth      OMEPRAZOLE PO Take by mouth daily      amoxicillin-clavulanate (AUGMENTIN) 875-125 mg per tablet Take 1 tablet by mouth every 12 (twelve) hours (Patient not taking: Reported on 5/1/2023)      carbamide peroxide (DEBROX) 6.5 % otic solution Administer 5 drops into ears 2 (two) times a day (Patient not taking: Reported on 5/1/2023) 15 mL 0    Cyanocobalamin (B-12 PO) Take by mouth in the morning (Patient not taking: Reported on 6/14/2023)      fluticasone (FLONASE) 50 mcg/act nasal spray 1 spray into each nostril daily (Patient not taking: Reported on 5/1/2023) 16 g 0    naproxen (NAPROSYN) 500 mg tablet Take 1 tablet (500 mg total) by mouth 2 (two) times a day with meals for 7 days (Patient not taking: Reported on 10/25/2023) 14 tablet 0     No current facility-administered medications for this visit.       Objective:    /76 (BP Location: Left arm, Patient Position: Standing, Cuff Size: Large)   Pulse 84   Temp 97.8 °F (36.6 °C) (Temporal)   Resp 18   Ht 5' 10\" (1.778 m)   Wt 102 kg " (224 lb)   SpO2 96%   BMI 32.14 kg/m²        Physical Exam  Vitals and nursing note reviewed.   Constitutional:       General: He is not in acute distress.     Appearance: Normal appearance.   Cardiovascular:      Rate and Rhythm: Normal rate and regular rhythm.      Pulses: Normal pulses.           Femoral pulses are 2+ on the right side.       Popliteal pulses are 2+ on the right side.        Dorsalis pedis pulses are 2+ on the right side.        Posterior tibial pulses are 2+ on the right side.      Heart sounds: Normal heart sounds.   Pulmonary:      Effort: Pulmonary effort is normal.      Breath sounds: Normal breath sounds.   Musculoskeletal:      Right lower leg: No edema.        Legs:    Neurological:      Mental Status: He is alert.                MINA Garcia

## 2024-03-20 ENCOUNTER — RA CDI HCC (OUTPATIENT)
Dept: OTHER | Facility: HOSPITAL | Age: 64
End: 2024-03-20

## 2024-03-20 NOTE — PROGRESS NOTES
HCC coding opportunities       Chart reviewed, no opportunity found: CHART REVIEWED, NO OPPORTUNITY FOUND        Patients Insurance        Commercial Insurance: [employer]

## 2024-03-27 ENCOUNTER — OFFICE VISIT (OUTPATIENT)
Dept: FAMILY MEDICINE CLINIC | Facility: CLINIC | Age: 64
End: 2024-03-27
Payer: COMMERCIAL

## 2024-03-27 VITALS
RESPIRATION RATE: 17 BRPM | HEART RATE: 63 BPM | WEIGHT: 224.6 LBS | DIASTOLIC BLOOD PRESSURE: 84 MMHG | SYSTOLIC BLOOD PRESSURE: 132 MMHG | HEIGHT: 70 IN | OXYGEN SATURATION: 99 % | BODY MASS INDEX: 32.16 KG/M2 | TEMPERATURE: 97.7 F

## 2024-03-27 DIAGNOSIS — I80.01 PHLEBITIS AND THROMBOPHLEBITIS OF SUPERFICIAL VESSELS OF RIGHT LOWER EXTREMITY: ICD-10-CM

## 2024-03-27 DIAGNOSIS — E55.9 VITAMIN D DEFICIENCY: ICD-10-CM

## 2024-03-27 DIAGNOSIS — R60.0 BILATERAL LEG EDEMA: ICD-10-CM

## 2024-03-27 DIAGNOSIS — I10 PRIMARY HYPERTENSION: Primary | ICD-10-CM

## 2024-03-27 DIAGNOSIS — R97.20 ELEVATED PSA: ICD-10-CM

## 2024-03-27 PROCEDURE — 99214 OFFICE O/P EST MOD 30 MIN: CPT | Performed by: NURSE PRACTITIONER

## 2024-03-27 RX ORDER — HYDROCHLOROTHIAZIDE 12.5 MG/1
12.5 TABLET ORAL DAILY
Qty: 90 TABLET | Refills: 3 | Status: SHIPPED | OUTPATIENT
Start: 2024-03-27 | End: 2025-03-22

## 2024-03-27 NOTE — PROGRESS NOTES
Assessment/Plan:    1. Primary hypertension  -     hydroCHLOROthiazide 12.5 mg tablet; Take 1 tablet (12.5 mg total) by mouth daily    2. Bilateral leg edema  -     hydroCHLOROthiazide 12.5 mg tablet; Take 1 tablet (12.5 mg total) by mouth daily    3. Vitamin D deficiency  -     Vitamin D 25 hydroxy; Future    4. Elevated PSA    5. Phlebitis and thrombophlebitis of superficial vessels of right lower extremity  Comments:  resolved        The case discussed with patient using patient centered shared decision making.The patient was counseled regarding instructions for management,-- risk factor reductions,-- prognosis,-- impressions,-- risks and benefits of treatment options,-- importance of compliance with treatment. I have reviewed the instructions with the patient, answering all questions to his satisfaction.    Patient clinically stable at this time.  Bp controlled  Lab slip provided for recheck 4/24  RTO as recommended and PRN    There are no Patient Instructions on file for this visit.    Return in about 5 months (around 8/27/2024).    Subjective:      Patient ID: Kavon Frias is a 64 y.o. male.    Chief Complaint   Patient presents with    Follow-up     5 month       63-year-old known to me presents for blood pressure check  Blood pressure has been controlled on current Rx  Patient doing well  Following heart healthy diet  Remaining active  Due for recheck labs 4/2024--bmp. Psa recheck in setting of slightly elevated psa    Edema has been stable-he is wearing compression socks when working              The following portions of the patient's history were reviewed and updated as appropriate: allergies, current medications, past family history, past medical history, past social history, past surgical history and problem list.    Review of Systems   Constitutional:  Positive for fatigue. Negative for fever and unexpected weight change.   Respiratory: Negative.     Cardiovascular:  Negative for chest pain,  "palpitations and leg swelling.   Neurological: Negative.          Current Outpatient Medications   Medication Sig Dispense Refill    amLODIPine (NORVASC) 5 mg tablet Take 1 tablet (5 mg total) by mouth daily 30 tablet 11    atorvastatin (LIPITOR) 20 mg tablet Take 1 tablet (20 mg total) by mouth daily 30 tablet 11    Cholecalciferol (Vitamin D3) 1.25 MG (29682 UT) CAPS Take by mouth in the morning      cyanocobalamin (VITAMIN B-12) 2000 MCG tablet Take 2,000 mcg by mouth daily      hydroCHLOROthiazide 12.5 mg tablet Take 1 tablet (12.5 mg total) by mouth daily 90 tablet 3    losartan (COZAAR) 100 MG tablet Take 1 tablet (100 mg total) by mouth daily 30 tablet 11    Omega-3 Fatty Acids (FISH OIL PO) Take 1,400 mg by mouth      OMEPRAZOLE PO Take by mouth daily      amoxicillin-clavulanate (AUGMENTIN) 875-125 mg per tablet Take 1 tablet by mouth every 12 (twelve) hours (Patient not taking: Reported on 5/1/2023)      carbamide peroxide (DEBROX) 6.5 % otic solution Administer 5 drops into ears 2 (two) times a day (Patient not taking: Reported on 5/1/2023) 15 mL 0    Cyanocobalamin (B-12 PO) Take by mouth in the morning (Patient not taking: Reported on 6/14/2023)      fluticasone (FLONASE) 50 mcg/act nasal spray 1 spray into each nostril daily (Patient not taking: Reported on 5/1/2023) 16 g 0    naproxen (NAPROSYN) 500 mg tablet Take 1 tablet (500 mg total) by mouth 2 (two) times a day with meals for 7 days (Patient not taking: Reported on 10/25/2023) 14 tablet 0     No current facility-administered medications for this visit.       Objective:    /84 (BP Location: Left arm, Patient Position: Sitting, Cuff Size: Large)   Pulse 63   Temp 97.7 °F (36.5 °C) (Temporal)   Resp 17   Ht 5' 10\" (1.778 m)   Wt 102 kg (224 lb 9.6 oz)   SpO2 99%   BMI 32.23 kg/m²        Physical Exam  Vitals and nursing note reviewed.   Constitutional:       General: He is not in acute distress.     Appearance: Normal appearance. "   Neck:      Vascular: No JVD.   Cardiovascular:      Rate and Rhythm: Normal rate and regular rhythm.      Pulses: Normal pulses.      Heart sounds: Normal heart sounds. No murmur heard.  Pulmonary:      Effort: Pulmonary effort is normal.      Breath sounds: Normal breath sounds.   Musculoskeletal:      Right lower leg: No edema.      Left lower leg: No edema.   Skin:     General: Skin is warm and dry.      Coloration: Skin is not pale.   Neurological:      General: No focal deficit present.      Mental Status: He is alert. Mental status is at baseline.                MINA Garcia

## 2024-06-10 LAB
25(OH)D3 SERPL-MCNC: 56 NG/ML (ref 30–100)
BUN SERPL-MCNC: 17 MG/DL (ref 7–25)
BUN/CREAT SERPL: NORMAL (CALC) (ref 6–22)
CALCIUM SERPL-MCNC: 8.7 MG/DL (ref 8.6–10.3)
CHLORIDE SERPL-SCNC: 106 MMOL/L (ref 98–110)
CO2 SERPL-SCNC: 29 MMOL/L (ref 20–32)
CREAT SERPL-MCNC: 1.16 MG/DL (ref 0.7–1.35)
GFR/BSA.PRED SERPLBLD CYS-BASED-ARV: 71 ML/MIN/1.73M2
GLUCOSE SERPL-MCNC: 99 MG/DL (ref 65–99)
POTASSIUM SERPL-SCNC: 4.2 MMOL/L (ref 3.5–5.3)
PSA SERPL-MCNC: 4.12 NG/ML
SODIUM SERPL-SCNC: 142 MMOL/L (ref 135–146)

## 2024-08-28 ENCOUNTER — TELEPHONE (OUTPATIENT)
Dept: FAMILY MEDICINE CLINIC | Facility: CLINIC | Age: 64
End: 2024-08-28

## 2024-09-04 ENCOUNTER — OFFICE VISIT (OUTPATIENT)
Dept: FAMILY MEDICINE CLINIC | Facility: CLINIC | Age: 64
End: 2024-09-04
Payer: COMMERCIAL

## 2024-09-04 VITALS
HEART RATE: 76 BPM | SYSTOLIC BLOOD PRESSURE: 128 MMHG | RESPIRATION RATE: 17 BRPM | TEMPERATURE: 98.2 F | OXYGEN SATURATION: 96 % | WEIGHT: 224.2 LBS | HEIGHT: 70 IN | BODY MASS INDEX: 32.1 KG/M2 | DIASTOLIC BLOOD PRESSURE: 80 MMHG

## 2024-09-04 DIAGNOSIS — Z71.2 ENCOUNTER TO DISCUSS TEST RESULTS: ICD-10-CM

## 2024-09-04 DIAGNOSIS — I10 PRIMARY HYPERTENSION: ICD-10-CM

## 2024-09-04 DIAGNOSIS — Z00.00 HEALTH CARE MAINTENANCE: Primary | ICD-10-CM

## 2024-09-04 PROCEDURE — 99396 PREV VISIT EST AGE 40-64: CPT | Performed by: NURSE PRACTITIONER

## 2024-09-04 PROCEDURE — 3074F SYST BP LT 130 MM HG: CPT | Performed by: NURSE PRACTITIONER

## 2024-09-04 PROCEDURE — 3079F DIAST BP 80-89 MM HG: CPT | Performed by: NURSE PRACTITIONER

## 2024-09-04 NOTE — PROGRESS NOTES
FAMILY PRACTICE HEALTH MAINTENANCE OFFICE VISIT  [facility] - [facility]    NAME: Kavon Frias  AGE: 64 y.o. SEX: male  : 1960     DATE: 2024    Assessment and Plan     1. Health care maintenance  2. Encounter to discuss test results  Comments:  Psa improved  creatinine improved  3. Primary hypertension  Comments:  controlled    Patient clinically stable at this time.  Cont with current plan of care.   RTO as recommended and PRN    Patient Counseling:   Nutrition: Stressed importance of a well balanced diet, moderation of sodium/saturated fat, caloric balance and sufficient intake of fiber  Exercise: Stressed the importance of regular exercise with a goal of 150 minutes per week  Dental Health: Discussed daily flossing and brushing and regular dental visits   Alcohol Use:  Recommended moderation of alcohol intake  Injury Prevention: Discussed Safety Belts, Safety Helmets, and Smoke Detectors    Immunizations reviewed: Up To Date  Discussed benefits of:  Colon Cancer Screening and Screening labs.  BMI Counseling: Body mass index is 32.17 kg/m². Discussed with patient's BMI with him. The BMI is above normal. Nutrition recommendations include reducing portion sizes, moderation in carbohydrate intake and increasing intake of lean protein. Exercise recommendations include exercising 3-5 times per week.    Return in about 4 months (around 2025) for Recheck.        Chief Complaint     Chief Complaint   Patient presents with    Annual Exam       History of Present Illness     Here for annual  Blood pressure normal  Reviewed recent blood work  Doing very well other than some aches and pains        Well Adult Physical   Patient here for a comprehensive physical exam.      Diet and Physical Activity  Diet: well balanced diet  Exercise: weekly      Depression Screen  PHQ-2/9 Depression Screening              General Health  Hearing: Normal:  bilateral  Vision: no vision  problems    Reproductive Health  No issues       The following portions of the patient's history were reviewed and updated as appropriate: allergies, current medications, past family history, past medical history, past social history, past surgical history and problem list.    Review of Systems     Review of Systems   Constitutional: Negative.    HENT:  Negative for trouble swallowing.    Eyes:  Negative for photophobia, pain and visual disturbance.   Respiratory: Negative.     Cardiovascular:  Positive for leg swelling. Negative for chest pain and palpitations.        Occasional leg swelling   Gastrointestinal: Negative.    Genitourinary:  Negative for difficulty urinating, dysuria and hematuria.   Musculoskeletal:  Positive for arthralgias (right knee, hands).   Neurological: Negative.    Hematological:  Does not bruise/bleed easily.   Psychiatric/Behavioral: Negative.         Past Medical History     Past Medical History:   Diagnosis Date    GERD (gastroesophageal reflux disease)     HTN (hypertension)     Hyperlipidemia        Past Surgical History     Past Surgical History:   Procedure Laterality Date    KIDNEY SURGERY         Social History     Social History     Socioeconomic History    Marital status: /Civil Union     Spouse name: None    Number of children: None    Years of education: None    Highest education level: None   Occupational History    None   Tobacco Use    Smoking status: Never    Smokeless tobacco: Never   Vaping Use    Vaping status: Never Used   Substance and Sexual Activity    Alcohol use: Yes     Comment: socially    Drug use: No    Sexual activity: None   Other Topics Concern    None   Social History Narrative    None     Social Determinants of Health     Financial Resource Strain: Not on file   Food Insecurity: Not on file   Transportation Needs: Not on file   Physical Activity: Not on file   Stress: Not on file   Social Connections: Not on file   Intimate Partner Violence: Not on  "file   Housing Stability: Not on file       Family History     History reviewed. No pertinent family history.    Current Medications       Current Outpatient Medications:     amLODIPine (NORVASC) 5 mg tablet, Take 1 tablet (5 mg total) by mouth daily, Disp: 30 tablet, Rfl: 11    atorvastatin (LIPITOR) 20 mg tablet, Take 1 tablet (20 mg total) by mouth daily, Disp: 30 tablet, Rfl: 11    Cholecalciferol (Vitamin D3) 1.25 MG (99895 UT) CAPS, Take by mouth in the morning, Disp: , Rfl:     cyanocobalamin (VITAMIN B-12) 2000 MCG tablet, Take 2,000 mcg by mouth daily, Disp: , Rfl:     hydroCHLOROthiazide 12.5 mg tablet, Take 1 tablet (12.5 mg total) by mouth daily, Disp: 90 tablet, Rfl: 3    losartan (COZAAR) 100 MG tablet, Take 1 tablet (100 mg total) by mouth daily, Disp: 30 tablet, Rfl: 11    Omega-3 Fatty Acids (FISH OIL PO), Take 1,400 mg by mouth, Disp: , Rfl:     OMEPRAZOLE PO, Take by mouth daily, Disp: , Rfl:     amoxicillin-clavulanate (AUGMENTIN) 875-125 mg per tablet, Take 1 tablet by mouth every 12 (twelve) hours (Patient not taking: Reported on 5/1/2023), Disp: , Rfl:     carbamide peroxide (DEBROX) 6.5 % otic solution, Administer 5 drops into ears 2 (two) times a day (Patient not taking: Reported on 5/1/2023), Disp: 15 mL, Rfl: 0    Cyanocobalamin (B-12 PO), Take by mouth in the morning (Patient not taking: Reported on 6/14/2023), Disp: , Rfl:     fluticasone (FLONASE) 50 mcg/act nasal spray, 1 spray into each nostril daily (Patient not taking: Reported on 5/1/2023), Disp: 16 g, Rfl: 0    naproxen (NAPROSYN) 500 mg tablet, Take 1 tablet (500 mg total) by mouth 2 (two) times a day with meals for 7 days (Patient not taking: Reported on 10/25/2023), Disp: 14 tablet, Rfl: 0     Allergies     No Known Allergies    Objective     /80 (BP Location: Left arm, Patient Position: Sitting, Cuff Size: Large)   Pulse 76   Temp 98.2 °F (36.8 °C) (Temporal)   Resp 17   Ht 5' 10\" (1.778 m)   Wt 102 kg (224 lb 3.2 " oz)   SpO2 96%   BMI 32.17 kg/m²      Physical Exam  Vitals and nursing note reviewed.   Constitutional:       General: He is not in acute distress.     Appearance: Normal appearance. He is obese.   HENT:      Head: Normocephalic and atraumatic.   Neck:      Vascular: No carotid bruit.   Cardiovascular:      Rate and Rhythm: Normal rate and regular rhythm.      Pulses: Normal pulses.      Heart sounds: Normal heart sounds.   Pulmonary:      Effort: Pulmonary effort is normal.      Breath sounds: Normal breath sounds. No wheezing or rales.   Abdominal:      General: Bowel sounds are normal.      Palpations: Abdomen is soft.      Tenderness: There is no abdominal tenderness.   Musculoskeletal:      Right lower leg: No edema.      Left lower leg: No edema.   Lymphadenopathy:      Cervical: No cervical adenopathy.   Skin:     General: Skin is warm and dry.      Coloration: Skin is not pale.   Neurological:      General: No focal deficit present.      Mental Status: He is alert. Mental status is at baseline.      Motor: No weakness.      Gait: Gait normal.   Psychiatric:         Mood and Affect: Mood normal.         Behavior: Behavior normal.           No results found.        MINA Garcia  [facility]

## 2024-12-26 DIAGNOSIS — E78.5 DYSLIPIDEMIA: ICD-10-CM

## 2024-12-26 DIAGNOSIS — I10 PRIMARY HYPERTENSION: ICD-10-CM

## 2024-12-26 RX ORDER — ATORVASTATIN CALCIUM 20 MG/1
20 TABLET, FILM COATED ORAL DAILY
Qty: 30 TABLET | Refills: 0 | Status: SHIPPED | OUTPATIENT
Start: 2024-12-26

## 2024-12-26 RX ORDER — AMLODIPINE BESYLATE 5 MG/1
5 TABLET ORAL DAILY
Qty: 30 TABLET | Refills: 5 | Status: SHIPPED | OUTPATIENT
Start: 2024-12-26

## 2024-12-26 RX ORDER — LOSARTAN POTASSIUM 100 MG/1
100 TABLET ORAL DAILY
Qty: 30 TABLET | Refills: 5 | Status: SHIPPED | OUTPATIENT
Start: 2024-12-26

## 2025-01-03 ENCOUNTER — RA CDI HCC (OUTPATIENT)
Dept: OTHER | Facility: HOSPITAL | Age: 65
End: 2025-01-03

## 2025-01-03 NOTE — PROGRESS NOTES
HCC coding opportunities       Chart reviewed, no opportunity found: CHART REVIEWED, NO OPPORTUNITY FOUND        Patients Insurance        Commercial Insurance: [employer]

## 2025-01-10 ENCOUNTER — OFFICE VISIT (OUTPATIENT)
Dept: FAMILY MEDICINE CLINIC | Facility: CLINIC | Age: 65
End: 2025-01-10
Payer: COMMERCIAL

## 2025-01-10 VITALS
OXYGEN SATURATION: 96 % | WEIGHT: 223 LBS | HEART RATE: 74 BPM | TEMPERATURE: 98.4 F | RESPIRATION RATE: 17 BRPM | HEIGHT: 70 IN | BODY MASS INDEX: 31.92 KG/M2 | DIASTOLIC BLOOD PRESSURE: 88 MMHG | SYSTOLIC BLOOD PRESSURE: 136 MMHG

## 2025-01-10 DIAGNOSIS — I10 PRIMARY HYPERTENSION: Primary | ICD-10-CM

## 2025-01-10 DIAGNOSIS — R97.20 ELEVATED PSA: ICD-10-CM

## 2025-01-10 DIAGNOSIS — J32.4 CHRONIC PANSINUSITIS: ICD-10-CM

## 2025-01-10 DIAGNOSIS — E78.5 DYSLIPIDEMIA: ICD-10-CM

## 2025-01-10 DIAGNOSIS — R06.09 DOE (DYSPNEA ON EXERTION): ICD-10-CM

## 2025-01-10 PROCEDURE — 99214 OFFICE O/P EST MOD 30 MIN: CPT | Performed by: NURSE PRACTITIONER

## 2025-01-10 RX ORDER — AZELASTINE 1 MG/ML
1 SPRAY, METERED NASAL 2 TIMES DAILY
Qty: 30 ML | Refills: 3 | Status: SHIPPED | OUTPATIENT
Start: 2025-01-10

## 2025-01-10 NOTE — PROGRESS NOTES
"Name: Kavon Frias      : 1960      MRN: [id number]  Encounter Provider: MINA Garcia  Encounter Date: 1/10/2025   Encounter department: [facility]  :  Assessment & Plan  Primary hypertension    Orders:    Echo complete w/ contrast if indicated; Future    Stress test only, exercise; Future    CBC and differential; Future    Basic metabolic panel; Future    TSH, 3rd generation with Free T4 reflex    Chronic pansinusitis    Orders:    azelastine (ASTELIN) 0.1 % nasal spray; 1 spray into each nostril 2 (two) times a day Use in each nostril as directed    QUACH (dyspnea on exertion)  Will order baseline cardiac testing in setting of longstanding hypertension, new exertional dyspnea likely related to recent diagnosis of pneumonia  He works a very physical job and thus far denies exertional chest pain or anginal equivalents when discussed  Will assist the patient in arranging the same  We will follow-up with patient to discuss results  Orders:    Echo complete w/ contrast if indicated; Future    Stress test only, exercise; Future    Dyslipidemia    Orders:    TSH, 3rd generation with Free T4 reflex    Lipid panel; Future    Elevated PSA    Orders:    PSA, Total Screen; Future    Patient clinically stable at this time.  No evidence of pneumonia or congestive heart failure on exam today  Cont with current plan of care.   RTO as recommended and PRN         History of Present Illness     64-year-old known to me presents for blood pressure check, general follow-up  Blood pressure has been controlled on current Rx  Patient endorses that he has not felt well-he [facility] \" [facility], [facility]\" [facility]-I reviewed [facility] records.  He was treated for pneumonia.  Today he tells me he is feeling slightly better but he still has exertional shortness of breath  He is worried that he may have heart issue.  He denies exertional chest pain.  He gets winded easily when " "walking uphill.  This is worsened since his pneumonia diagnosis  Many years ago he donated a kidney to his friend.  During the preoperative workup he was required to have extensive evaluation including heart catheterization.  He endorses that his heart was very healthy although this record is not available to me    Following heart healthy diet  Remaining active  Due for recheck labs  Psa recheck in setting of slightly elevated psa    Edema has been stable-he is wearing compression socks when working            Review of Systems   Constitutional:  Positive for fatigue. Negative for fever and unexpected weight change.   HENT:  Positive for congestion and postnasal drip. Negative for trouble swallowing.    Respiratory:  Positive for shortness of breath (exertional shortness of breath since having pneumonia last month). Negative for cough.    Cardiovascular:  Positive for leg swelling. Negative for chest pain and palpitations.   Gastrointestinal:  Negative for abdominal pain and blood in stool.   Endocrine: Negative.    Genitourinary:  Negative for decreased urine volume, difficulty urinating and hematuria.   Musculoskeletal:  Negative for gait problem.   Skin:  Negative for pallor.   Neurological:  Negative for dizziness, syncope and weakness.   Hematological:  Does not bruise/bleed easily.   Psychiatric/Behavioral:  Negative for confusion.        Objective   /88 (BP Location: Left arm, Patient Position: Sitting, Cuff Size: Large)   Pulse 74   Temp 98.4 °F (36.9 °C) (Temporal)   Resp 17   Ht 5' 10\" (1.778 m)   Wt 101 kg (223 lb)   SpO2 96%   BMI 32.00 kg/m²      Physical Exam  Vitals and nursing note reviewed.   Constitutional:       General: He is not in acute distress.     Appearance: Normal appearance.   HENT:      Head: Normocephalic and atraumatic.   Neck:      Vascular: No carotid bruit.   Cardiovascular:      Rate and Rhythm: Normal rate and regular rhythm.      Pulses: Normal pulses.      Heart " sounds: No murmur heard.  Pulmonary:      Effort: Pulmonary effort is normal.      Breath sounds: Normal breath sounds. No rhonchi or rales.   Abdominal:      General: Bowel sounds are normal.      Palpations: Abdomen is soft.      Tenderness: There is no abdominal tenderness.   Musculoskeletal:      Right lower leg: No edema.      Left lower leg: No edema.   Lymphadenopathy:      Cervical: No cervical adenopathy.   Skin:     General: Skin is warm and dry.      Coloration: Skin is not pale.   Neurological:      General: No focal deficit present.      Mental Status: He is alert. Mental status is at baseline.   Psychiatric:         Mood and Affect: Mood normal.         Behavior: Behavior normal.

## 2025-01-10 NOTE — ASSESSMENT & PLAN NOTE
Orders:    Echo complete w/ contrast if indicated; Future    Stress test only, exercise; Future    CBC and differential; Future    Basic metabolic panel; Future    TSH, 3rd generation with Free T4 reflex

## 2025-01-24 DIAGNOSIS — E78.5 DYSLIPIDEMIA: ICD-10-CM

## 2025-01-24 RX ORDER — ATORVASTATIN CALCIUM 20 MG/1
20 TABLET, FILM COATED ORAL DAILY
Qty: 90 TABLET | Refills: 1 | Status: SHIPPED | OUTPATIENT
Start: 2025-01-24

## 2025-02-05 ENCOUNTER — HOSPITAL ENCOUNTER (OUTPATIENT)
Dept: NON INVASIVE DIAGNOSTICS | Facility: HOSPITAL | Age: 65
Discharge: HOME | End: 2025-02-05
Payer: COMMERCIAL

## 2025-02-05 VITALS
SYSTOLIC BLOOD PRESSURE: 134 MMHG | OXYGEN SATURATION: 95 % | DIASTOLIC BLOOD PRESSURE: 64 MMHG | HEART RATE: 78 BPM | WEIGHT: 223 LBS | BODY MASS INDEX: 31.92 KG/M2 | HEIGHT: 70 IN

## 2025-02-05 VITALS
BODY MASS INDEX: 31.92 KG/M2 | HEIGHT: 70 IN | HEART RATE: 74 BPM | SYSTOLIC BLOOD PRESSURE: 136 MMHG | WEIGHT: 223 LBS | DIASTOLIC BLOOD PRESSURE: 88 MMHG

## 2025-02-05 DIAGNOSIS — R06.09 DOE (DYSPNEA ON EXERTION): ICD-10-CM

## 2025-02-05 DIAGNOSIS — I10 PRIMARY HYPERTENSION: ICD-10-CM

## 2025-02-05 LAB
AORTIC ROOT: 3.7 CM
ASCENDING AORTA: 4.1 CM
BSA FOR ECHO PROCEDURE: 2.19 M2
DOP CALC LVOT AREA: 4.52 CM2
DOP CALC LVOT DIAMETER: 2.4 CM
E WAVE DECELERATION TIME: 181 MS
E/A RATIO: 1
FRACTIONAL SHORTENING: 33 (ref 28–44)
INTERVENTRICULAR SEPTUM IN DIASTOLE (PARASTERNAL SHORT AXIS VIEW): 1.1 CM
INTERVENTRICULAR SEPTUM: 1.1 CM (ref 0.6–1.1)
LAAS-AP2: 27.4 CM2
LAAS-AP4: 20 CM2
LEFT ATRIUM SIZE: 3.4 CM
LEFT ATRIUM VOLUME (MOD BIPLANE): 72 ML
LEFT ATRIUM VOLUME INDEX (MOD BIPLANE): 26 ML/M2
LEFT INTERNAL DIMENSION IN SYSTOLE: 3.2 CM (ref 2.1–4)
LEFT VENTRICULAR INTERNAL DIMENSION IN DIASTOLE: 4.8 CM (ref 3.5–6)
LEFT VENTRICULAR POSTERIOR WALL IN END DIASTOLE: 0.9 CM
LEFT VENTRICULAR STROKE VOLUME: 68 ML
LV EF US.2D.A4C+ESTIMATED: 62 %
LVSV (TEICH): 68 ML
MAX HR PERCENT: 86 %
MAX HR: 136 BPM
MV E'TISSUE VEL-SEP: 10 CM/S
MV PEAK A VEL: 0.69 M/S
MV PEAK E VEL: 69 CM/S
RA PRESSURE ESTIMATED: 3 MMHG
RATE PRESSURE PRODUCT: NORMAL
RIGHT ATRIAL 2D VOLUME: 42 ML
RIGHT ATRIUM AREA SYSTOLE A4C: 16.6 CM2
RIGHT VENTRICLE ID DIMENSION: 3.8 CM
SINOTUBULAR JUNCTION: 3.4 CM
SL CV LEFT ATRIUM LENGTH A2C: 6.3 CM
SL CV LV EF: 60
SL CV PED ECHO LEFT VENTRICLE DIASTOLIC VOLUME (MOD BIPLANE) 2D: 108 ML
SL CV PED ECHO LEFT VENTRICLE SYSTOLIC VOLUME (MOD BIPLANE) 2D: 40 ML
SL CV SINUS OF VALSALVA 2D: 3.9 CM
SL CV STRESS RECOVERY BP: NORMAL MMHG
SL CV STRESS RECOVERY HR: 73 BPM
SL CV STRESS RECOVERY O2 SAT: 97 %
SL CV STRESS STAGE REACHED: 2
STJ: 3.4 CM
STRESS ANGINA INDEX: 0
STRESS BASELINE BP: NORMAL MMHG
STRESS BASELINE HR: 78 BPM
STRESS DUKE TREADMILL SCORE: 5
STRESS O2 SAT REST: 95 %
STRESS PEAK HR: 134 BPM
STRESS POST ESTIMATED WORKLOAD: 7 METS
STRESS POST EXERCISE DUR MIN: 5 MIN
STRESS POST EXERCISE DUR SEC: 12 SEC
STRESS POST O2 SAT PEAK: 97 %
STRESS POST PEAK BP: 200 MMHG
STRESS ST DEPRESSION: 0 MM
TRICUSPID ANNULAR PLANE SYSTOLIC EXCURSION: 2.5 CM

## 2025-02-05 PROCEDURE — 93306 TTE W/DOPPLER COMPLETE: CPT | Performed by: INTERNAL MEDICINE

## 2025-02-05 PROCEDURE — 93018 CV STRESS TEST I&R ONLY: CPT | Performed by: INTERNAL MEDICINE

## 2025-02-05 PROCEDURE — 93017 CV STRESS TEST TRACING ONLY: CPT

## 2025-02-05 PROCEDURE — 93306 TTE W/DOPPLER COMPLETE: CPT

## 2025-02-05 PROCEDURE — 93016 CV STRESS TEST SUPVJ ONLY: CPT | Performed by: INTERNAL MEDICINE

## 2025-02-06 ENCOUNTER — RESULTS FOLLOW-UP (OUTPATIENT)
Dept: FAMILY MEDICINE CLINIC | Facility: CLINIC | Age: 65
End: 2025-02-06

## 2025-02-06 LAB
CHEST PAIN STATEMENT: NORMAL
MAX DIASTOLIC BP: 86 MMHG
MAX PREDICTED HEART RATE: 158 BPM
PROTOCOL NAME: NORMAL
REASON FOR TERMINATION: NORMAL
STRESS POST EXERCISE DUR MIN: 5 MIN
STRESS POST EXERCISE DUR SEC: 12 SEC
STRESS POST PEAK HR: 136 BPM
STRESS POST PEAK SYSTOLIC BP: 210 MMHG
TARGET HR FORMULA: NORMAL
TEST INDICATION: NORMAL

## 2025-03-23 LAB
BASOPHILS # BLD AUTO: 22 CELLS/UL (ref 0–200)
BASOPHILS NFR BLD AUTO: 0.4 %
BUN SERPL-MCNC: 12 MG/DL (ref 7–25)
BUN/CREAT SERPL: NORMAL (CALC) (ref 6–22)
CALCIUM SERPL-MCNC: 9.2 MG/DL (ref 8.6–10.3)
CHLORIDE SERPL-SCNC: 106 MMOL/L (ref 98–110)
CHOLEST SERPL-MCNC: 126 MG/DL
CHOLEST/HDLC SERPL: 3 (CALC)
CO2 SERPL-SCNC: 30 MMOL/L (ref 20–32)
CREAT SERPL-MCNC: 1.03 MG/DL (ref 0.7–1.35)
EOSINOPHIL # BLD AUTO: 101 CELLS/UL (ref 15–500)
EOSINOPHIL NFR BLD AUTO: 1.8 %
ERYTHROCYTE [DISTWIDTH] IN BLOOD BY AUTOMATED COUNT: 12.4 % (ref 11–15)
GFR/BSA.PRED SERPLBLD CYS-BASED-ARV: 82 ML/MIN/1.73M2
GLUCOSE SERPL-MCNC: 82 MG/DL (ref 65–99)
HCT VFR BLD AUTO: 44.5 % (ref 38.5–50)
HDLC SERPL-MCNC: 42 MG/DL
HGB BLD-MCNC: 14.7 G/DL (ref 13.2–17.1)
LDLC SERPL CALC-MCNC: 68 MG/DL (CALC)
LYMPHOCYTES # BLD AUTO: 1366 CELLS/UL (ref 850–3900)
LYMPHOCYTES NFR BLD AUTO: 24.4 %
MCH RBC QN AUTO: 30.3 PG (ref 27–33)
MCHC RBC AUTO-ENTMCNC: 33 G/DL (ref 32–36)
MCV RBC AUTO: 91.8 FL (ref 80–100)
MONOCYTES # BLD AUTO: 482 CELLS/UL (ref 200–950)
MONOCYTES NFR BLD AUTO: 8.6 %
NEUTROPHILS # BLD AUTO: 3629 CELLS/UL (ref 1500–7800)
NEUTROPHILS NFR BLD AUTO: 64.8 %
NONHDLC SERPL-MCNC: 84 MG/DL (CALC)
PLATELET # BLD AUTO: 200 THOUSAND/UL (ref 140–400)
PMV BLD REES-ECKER: 9.2 FL (ref 7.5–12.5)
POTASSIUM SERPL-SCNC: 4.2 MMOL/L (ref 3.5–5.3)
PSA SERPL-MCNC: 5.27 NG/ML
RBC # BLD AUTO: 4.85 MILLION/UL (ref 4.2–5.8)
SODIUM SERPL-SCNC: 143 MMOL/L (ref 135–146)
TRIGL SERPL-MCNC: 81 MG/DL
TSH SERPL-ACNC: 0.51 MIU/L (ref 0.4–4.5)
WBC # BLD AUTO: 5.6 THOUSAND/UL (ref 3.8–10.8)

## 2025-05-19 ENCOUNTER — OFFICE VISIT (OUTPATIENT)
Dept: FAMILY MEDICINE CLINIC | Facility: CLINIC | Age: 65
End: 2025-05-19
Payer: COMMERCIAL

## 2025-05-19 VITALS
SYSTOLIC BLOOD PRESSURE: 130 MMHG | WEIGHT: 224 LBS | HEART RATE: 67 BPM | TEMPERATURE: 98.4 F | HEIGHT: 70 IN | OXYGEN SATURATION: 97 % | BODY MASS INDEX: 32.07 KG/M2 | DIASTOLIC BLOOD PRESSURE: 84 MMHG

## 2025-05-19 DIAGNOSIS — R97.20 ELEVATED PSA: ICD-10-CM

## 2025-05-19 DIAGNOSIS — I10 PRIMARY HYPERTENSION: Primary | ICD-10-CM

## 2025-05-19 DIAGNOSIS — R05.3 CHRONIC COUGH: ICD-10-CM

## 2025-05-19 DIAGNOSIS — K21.9 LARYNGOPHARYNGEAL REFLUX (LPR): ICD-10-CM

## 2025-05-19 DIAGNOSIS — Z90.5 HISTORY OF RIGHT NEPHRECTOMY: ICD-10-CM

## 2025-05-19 DIAGNOSIS — Z12.5 PROSTATE CANCER SCREENING: ICD-10-CM

## 2025-05-19 DIAGNOSIS — R06.09 DOE (DYSPNEA ON EXERTION): ICD-10-CM

## 2025-05-19 DIAGNOSIS — R60.0 BILATERAL LEG EDEMA: ICD-10-CM

## 2025-05-19 DIAGNOSIS — J32.4 CHRONIC PANSINUSITIS: ICD-10-CM

## 2025-05-19 PROCEDURE — 99214 OFFICE O/P EST MOD 30 MIN: CPT | Performed by: NURSE PRACTITIONER

## 2025-05-19 RX ORDER — PANTOPRAZOLE SODIUM 40 MG/1
40 TABLET, DELAYED RELEASE ORAL
Qty: 30 TABLET | Refills: 1 | Status: SHIPPED | OUTPATIENT
Start: 2025-05-19 | End: 2025-11-15

## 2025-05-19 RX ORDER — AZELASTINE 1 MG/ML
1 SPRAY, METERED NASAL 2 TIMES DAILY
Qty: 30 ML | Refills: 3 | Status: SHIPPED | OUTPATIENT
Start: 2025-05-19

## 2025-05-19 NOTE — ASSESSMENT & PLAN NOTE
Blood pressure at baseline  No further action needed today-continue current medications  Orders:    Basic metabolic panel; Future    CBC and differential; Future

## 2025-05-19 NOTE — PROGRESS NOTES
Name: Kavon Frias      : 1960      MRN: [id number]  Encounter Provider: MINA Garcia  Encounter Date: 2025   [facility]  :  Assessment & Plan  Primary hypertension  Blood pressure at baseline  No further action needed today-continue current medications  Orders:    Basic metabolic panel; Future    CBC and differential; Future    Chronic pansinusitis    Orders:    azelastine (ASTELIN) 0.1 % nasal spray; 1 spray into each nostril 2 (two) times a day Use in each nostril as directed    QUACH (dyspnea on exertion)    Orders:    Complete PFT with post bronchodilator; Future    Chronic cough  We lengthy discussion regarding chronic cough of 1 to 2 years  He is exposed to workplace irritants-has been there for many years  Query lung disease related to this  Additionally pattern of cough and increased sputum is overnight-he has known reflux for which he takes over-the-counter omeprazole.  Denies heartburn which is the reason he takes this.  Query silent reflux/LPR as culprit as well  We will order PFT to rule out underlying early COPD versus asthma  Will order course of prescription strength pantoprazole for acid suppression  Patient advised to update me in 3 weeks on medication if cough improves  Orders:    Complete PFT with post bronchodilator; Future    pantoprazole (PROTONIX) 40 mg tablet; Take 1 tablet (40 mg total) by mouth daily before breakfast    Elevated PSA  PSA remains borderline elevated since   Patient denies symptoms of obstruction  Again we discussed options for follow-up  He declines imaging or urology referral at this time  We will recheck PSA in 6 months from the last  If it continues to taper upward, he agrees to urology referral  Orders:    PSA, Total Screen; Future    Prostate cancer screening    Orders:    PSA, Total Screen; Future    Laryngopharyngeal reflux (LPR)    Orders:    pantoprazole (PROTONIX) 40 mg tablet; Take 1 tablet  (40 mg total) by mouth daily before breakfast    History of right nephrectomy         Bilateral leg edema                History of Present Illness   65-year-old known to me presents for blood pressure check, multi issue-he is accompanied by his wife  Blood pressure has been controlled on current Rx    He recovered from a bout of bronchitis he experienced in December.  He is concerned about a chronic cough that seems to be nocturnal in nature with increased sputum  He has known allergies but the symptoms have improved since taking antihistamine and Astelin  He takes over-the-counter omeprazole for heartburn.   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  He had cardiac testing last fall echocardiogram which was normal.  Stress test showed no ischemia    Many years ago he donated a kidney to his friend.  During the preoperative workup he was required to have extensive evaluation including heart catheterization.  He endorses that his heart was very healthy although this record is not available to me    Following heart healthy diet  Remaining active  Due for recheck labs  Psa recheck in setting of slightly elevated psa    Edema has been stable-he is wearing compression socks when working            Review of Systems   Constitutional:  Positive for fatigue. Negative for fever and unexpected weight change.   HENT:  Positive for congestion (improved) and postnasal drip (improved). Negative for trouble swallowing.    Respiratory:  Positive for cough (increased phlegm in AM). Negative for shortness of breath.    Cardiovascular:  Positive for leg swelling. Negative for chest pain and palpitations.   Gastrointestinal:  Negative for abdominal pain and blood in stool.   Endocrine: Negative.    Genitourinary:  Negative for decreased urine volume, difficulty urinating  "and hematuria.   Musculoskeletal:  Negative for gait problem.   Skin:  Negative for pallor.   Neurological:  Negative for dizziness, syncope and weakness.   Hematological:  Does not bruise/bleed easily.   Psychiatric/Behavioral:  Negative for confusion.        Objective   /84 (BP Location: Left arm, Patient Position: Sitting, Cuff Size: Standard)   Pulse 67   Temp 98.4 °F (36.9 °C) (Temporal)   Ht 5' 10\" (1.778 m)   Wt 102 kg (224 lb)   SpO2 97%   BMI 32.14 kg/m²      Physical Exam  Vitals and nursing note reviewed.   Constitutional:       General: He is not in acute distress.     Appearance: Normal appearance.   HENT:      Head: Normocephalic and atraumatic.   Neck:      Vascular: No carotid bruit.     Cardiovascular:      Rate and Rhythm: Normal rate and regular rhythm.      Pulses: Normal pulses.      Heart sounds: No murmur heard.  Pulmonary:      Effort: Pulmonary effort is normal.      Breath sounds: Normal breath sounds. No rhonchi or rales.   Abdominal:      General: Bowel sounds are normal.      Palpations: Abdomen is soft.      Tenderness: There is no abdominal tenderness.     Musculoskeletal:      Right lower leg: No edema.      Left lower leg: No edema.   Lymphadenopathy:      Cervical: No cervical adenopathy.     Skin:     General: Skin is warm and dry.      Coloration: Skin is not pale.     Neurological:      General: No focal deficit present.      Mental Status: He is alert. Mental status is at baseline.     Psychiatric:         Mood and Affect: Mood normal.         Behavior: Behavior normal.         "

## 2025-06-16 DIAGNOSIS — I10 PRIMARY HYPERTENSION: ICD-10-CM

## 2025-06-16 RX ORDER — LOSARTAN POTASSIUM 100 MG/1
100 TABLET ORAL DAILY
Qty: 30 TABLET | Refills: 5 | Status: SHIPPED | OUTPATIENT
Start: 2025-06-16

## 2025-06-16 RX ORDER — AMLODIPINE BESYLATE 5 MG/1
5 TABLET ORAL DAILY
Qty: 30 TABLET | Refills: 5 | Status: SHIPPED | OUTPATIENT
Start: 2025-06-16

## 2025-07-15 DIAGNOSIS — K21.9 LARYNGOPHARYNGEAL REFLUX (LPR): ICD-10-CM

## 2025-07-15 DIAGNOSIS — R05.3 CHRONIC COUGH: ICD-10-CM

## 2025-07-15 DIAGNOSIS — E78.5 DYSLIPIDEMIA: ICD-10-CM

## 2025-07-16 RX ORDER — ATORVASTATIN CALCIUM 20 MG/1
20 TABLET, FILM COATED ORAL DAILY
Qty: 90 TABLET | Refills: 1 | Status: SHIPPED | OUTPATIENT
Start: 2025-07-16

## 2025-07-16 RX ORDER — PANTOPRAZOLE SODIUM 40 MG/1
TABLET, DELAYED RELEASE ORAL
Qty: 30 TABLET | Refills: 5 | Status: SHIPPED | OUTPATIENT
Start: 2025-07-16